# Patient Record
Sex: FEMALE | Race: WHITE | NOT HISPANIC OR LATINO | Employment: UNEMPLOYED | URBAN - NONMETROPOLITAN AREA
[De-identification: names, ages, dates, MRNs, and addresses within clinical notes are randomized per-mention and may not be internally consistent; named-entity substitution may affect disease eponyms.]

---

## 2017-01-18 ENCOUNTER — TELEPHONE (OUTPATIENT)
Dept: CARDIOLOGY | Facility: CLINIC | Age: 62
End: 2017-01-18

## 2017-01-18 NOTE — TELEPHONE ENCOUNTER
Patient scheduled with Dr. Santillan at 3pm tomorrow patient aware of appt date and time and to bring in complete list of medications or medication bottles with her for appt.  Patient verbalizes understanding.

## 2017-01-18 NOTE — TELEPHONE ENCOUNTER
Patient called in requesting appt today,  reporting having trouble breathing accompanied with CP for few days now, does not have a device to check b/p or pulse, advised patient several times to go to er for evaluation.  Patient is requesting appt Thursday. 1-19-17  with You, Do you want to see her tomorrow? Thank You.

## 2017-05-16 ENCOUNTER — OFFICE VISIT (OUTPATIENT)
Dept: CARDIOLOGY | Facility: CLINIC | Age: 62
End: 2017-05-16

## 2017-05-16 ENCOUNTER — TELEPHONE (OUTPATIENT)
Dept: CARDIOLOGY | Facility: CLINIC | Age: 62
End: 2017-05-16

## 2017-05-16 VITALS
HEART RATE: 60 BPM | HEIGHT: 61 IN | WEIGHT: 139 LBS | DIASTOLIC BLOOD PRESSURE: 70 MMHG | SYSTOLIC BLOOD PRESSURE: 130 MMHG | BODY MASS INDEX: 26.24 KG/M2

## 2017-05-16 DIAGNOSIS — E78.49 OTHER HYPERLIPIDEMIA: ICD-10-CM

## 2017-05-16 DIAGNOSIS — E11.9 TYPE 2 DIABETES MELLITUS WITHOUT COMPLICATION, WITHOUT LONG-TERM CURRENT USE OF INSULIN (HCC): ICD-10-CM

## 2017-05-16 DIAGNOSIS — I10 ESSENTIAL HYPERTENSION: ICD-10-CM

## 2017-05-16 DIAGNOSIS — I25.10 CORONARY ARTERY DISEASE INVOLVING NATIVE CORONARY ARTERY OF NATIVE HEART WITHOUT ANGINA PECTORIS: Primary | ICD-10-CM

## 2017-05-16 PROBLEM — E03.9 HYPOTHYROID: Status: ACTIVE | Noted: 2017-05-16

## 2017-05-16 PROBLEM — E78.5 HYPERLIPEMIA: Status: ACTIVE | Noted: 2017-05-16

## 2017-05-16 PROCEDURE — 99214 OFFICE O/P EST MOD 30 MIN: CPT | Performed by: NURSE PRACTITIONER

## 2017-05-16 RX ORDER — LEVOTHYROXINE SODIUM 88 UG/1
88 TABLET ORAL DAILY
COMMUNITY
End: 2021-06-04

## 2017-05-16 RX ORDER — RANOLAZINE 500 MG/1
TABLET, EXTENDED RELEASE ORAL
Qty: 360 TABLET | Refills: 2 | Status: SHIPPED | OUTPATIENT
Start: 2017-05-16 | End: 2018-02-06 | Stop reason: SDUPTHER

## 2017-06-15 ENCOUNTER — OUTSIDE FACILITY SERVICE (OUTPATIENT)
Dept: CARDIOLOGY | Facility: CLINIC | Age: 62
End: 2017-06-15

## 2017-06-15 PROCEDURE — 93458 L HRT ARTERY/VENTRICLE ANGIO: CPT | Performed by: INTERNAL MEDICINE

## 2017-06-16 ENCOUNTER — OUTSIDE FACILITY SERVICE (OUTPATIENT)
Dept: CARDIOLOGY | Facility: CLINIC | Age: 62
End: 2017-06-16

## 2017-06-16 PROCEDURE — 99217 PR OBSERVATION CARE DISCHARGE MANAGEMENT: CPT | Performed by: INTERNAL MEDICINE

## 2017-08-16 ENCOUNTER — OFFICE VISIT (OUTPATIENT)
Dept: CARDIOLOGY | Facility: CLINIC | Age: 62
End: 2017-08-16

## 2017-08-16 VITALS
DIASTOLIC BLOOD PRESSURE: 68 MMHG | BODY MASS INDEX: 25.86 KG/M2 | WEIGHT: 137 LBS | SYSTOLIC BLOOD PRESSURE: 130 MMHG | HEART RATE: 80 BPM | HEIGHT: 61 IN

## 2017-08-16 DIAGNOSIS — E78.00 PURE HYPERCHOLESTEROLEMIA: ICD-10-CM

## 2017-08-16 DIAGNOSIS — I25.10 CORONARY ARTERY DISEASE INVOLVING NATIVE CORONARY ARTERY OF NATIVE HEART WITHOUT ANGINA PECTORIS: Primary | ICD-10-CM

## 2017-08-16 DIAGNOSIS — I34.0 NON-RHEUMATIC MITRAL REGURGITATION: ICD-10-CM

## 2017-08-16 DIAGNOSIS — Z95.5 PRESENCE OF STENT IN LAD CORONARY ARTERY: ICD-10-CM

## 2017-08-16 DIAGNOSIS — I10 ESSENTIAL HYPERTENSION: ICD-10-CM

## 2017-08-16 PROCEDURE — 99213 OFFICE O/P EST LOW 20 MIN: CPT | Performed by: NURSE PRACTITIONER

## 2017-08-16 RX ORDER — ACYCLOVIR 400 MG/1
400 TABLET ORAL DAILY
COMMUNITY

## 2017-08-16 RX ORDER — NITROGLYCERIN 0.4 MG/1
0.4 TABLET SUBLINGUAL
Qty: 25 TABLET | Refills: 1 | Status: SHIPPED | OUTPATIENT
Start: 2017-08-16 | End: 2017-08-30 | Stop reason: SDUPTHER

## 2017-08-16 RX ORDER — VENLAFAXINE 75 MG/1
75 TABLET ORAL DAILY
COMMUNITY
End: 2022-01-01

## 2017-08-16 NOTE — PROGRESS NOTES
Chief Complaint   Patient presents with   • Hospital follow-up     heart cath with stenting to LAD at [facility] on 6/15/17. labs per PCP, patient brought medication list with visit.        Subjective       Sunday Mcclure is a 62 y.o. female  with a history of ischemic heart disease diagnosed in July 2013. She underwent thrombectomy of the LAD followed by atherectomy and stenting. Her stress test in 2013 showed septal infarct with jaime-infarct ischemia. In 2015 she was admitted to [facility] after syncopal episode. She underwent workup including an echocardiogram that showed normal ejection fraction. Carotid ultrasound showed disease of the right internal carotid artery. Brain scan did not show acute changes. An MARIANA was also done that showed moderate to severe neuropathy. She then underwent physical therapy at home and had no residual effects on memory or mobility. Given risk of silent ischemia and history of CAD repeat stress testing and repeat carotid ultrasound recommended. Stress showed apical infarct with jaime-infarct ischemia. Elective cath was recommended. At her last office visit she reported indigestion and a pressure in the middle of her chest. Cardiac cath was done and stent placed in her LAD.  Today she comes to the office for a follow up appointment and states she is feeling much better. No indigestion or chest discomfort noted.      HPI         Cardiac History:    Past Surgical History:   Procedure Laterality Date   • CARDIAC CATHETERIZATION  07/08/2013    EF 45%. 88% LAD- PTCA & Throbectomy. R/O Dissection    • CARDIAC CATHETERIZATION  07/24/2013    ( Dr. Singer) 85% LAD- Atherectomy & 3.5x32 Promus    • CARDIAC CATHETERIZATION  06/15/2017    99% LAD- 2.25x12 & 2.25x28 Promus. 40% RCA- FFR- 0.89   • CARDIOVASCULAR STRESS TEST  08/05/2013     L. Myoview- ([facility]) EF 50% Septal Infarct with jaime-infarct ischemia    • CARDIOVASCULAR STRESS TEST  11/07/2016    EF 65%, apical infarct with jaime-infarct  ischemia, continue current   • ECHO - CONVERTED  07/09/2013     ([facility]) EF 35%    • ECHO - CONVERTED  08/05/2013    ([facility]) EF 40%    • ECHO - CONVERTED  08/11/2015    ([facility]) - EF 55-60%    • ECHO - CONVERTED  11/07/2016    EF 60-65%, mild AR, mild MR   • US CAROTID UNILATERAL  11/03/2016    Less than 50% bilaterally       Current Outpatient Prescriptions   Medication Sig Dispense Refill   • acyclovir (ZOVIRAX) 400 MG tablet Take 400 mg by mouth Daily. Take no more than 5 doses a day.     • aspirin 81 MG EC tablet Take 81 mg by mouth Daily.     • atorvastatin (LIPITOR) 20 MG tablet Take 20 mg by mouth Daily.     • Calcium Polycarbophil (FIBERTAB PO) Take 3 tablets by mouth Daily.     • insulin aspart (novoLOG) 100 UNIT/ML injection Per insulin pump as directed.     • LamoTRIgine (LAMICTAL XR PO) Take 300 mg by mouth Daily.     • levothyroxine (SYNTHROID) 88 MCG tablet Take 88 mcg by mouth Daily.     • nitroglycerin (NITROSTAT) 0.4 MG SL tablet Place 1 tablet under the tongue Every 5 (Five) Minutes As Needed for Chest Pain. Take no more than 3 doses in 15 minutes. 25 tablet 1   • polyethylene glycol (MIRALAX) packet Take 17 g by mouth Daily.     • prasugrel (EFFIENT) 10 MG tablet Take 10 mg by mouth Daily.     • pregabalin (LYRICA) 50 MG capsule Take 50 mg by mouth 3 (Three) Times a Day.     • ranolazine (RANEXA) 500 MG 12 hr tablet 2 tablets twice daily 360 tablet 2   • venlafaxine (EFFEXOR) 75 MG tablet Take 75 mg by mouth Daily.     • venlafaxine XR (EFFEXOR-XR) 150 MG 24 hr capsule Take 150 mg by mouth Daily.       No current facility-administered medications for this visit.        Review of patient's allergies indicates no known allergies.    Past Medical History:   Diagnosis Date   • MARIANA: Moderate to severe sensoral neuropathy 08/12/2015   • Anxiety    • Arthritis    • Breast cancer     lumpectomy left breast, radiation   • Diabetes    • Diabetic neuropathy    • H/O carpal tunnel repair     multiple  "surgeries   • H/O hand surgery     trigger finger   • H/O thyroidectomy     thyroid cancer   • H/O tubal ligation    • H/O:     • History of knee replacement     Left and right   • Hx of appendectomy    • Hypercholesterolemia    • Hypertension    • MRI head: No acute CVA seen 2015       Social History     Social History   • Marital status:      Spouse name: N/A   • Number of children: N/A   • Years of education: N/A     Occupational History   • Not on file.     Social History Main Topics   • Smoking status: Never Smoker   • Smokeless tobacco: Never Used   • Alcohol use No   • Drug use: No   • Sexual activity: Not on file     Other Topics Concern   • Not on file     Social History Narrative       Family History   Problem Relation Age of Onset   • No Known Problems Mother    • Lymphoma Father    • Myasthenia gravis Sister        Review of Systems   Constitution: Negative for decreased appetite and malaise/fatigue.   HENT: Negative for congestion and sore throat.    Eyes: Negative for blurred vision.   Cardiovascular: Negative for chest pain, dyspnea on exertion, leg swelling, near-syncope and palpitations.   Respiratory: Negative for shortness of breath and snoring.    Endocrine: Negative for cold intolerance and heat intolerance.   Hematologic/Lymphatic: Negative for adenopathy. Does not bruise/bleed easily.   Skin: Negative for itching, nail changes and skin cancer.   Musculoskeletal: Negative for arthritis and myalgias.   Gastrointestinal: Negative for abdominal pain, dysphagia, heartburn and nausea.   Genitourinary: Negative for bladder incontinence and frequency.   Neurological: Negative for dizziness, light-headedness, seizures and vertigo.   Psychiatric/Behavioral: Negative for altered mental status.   Allergic/Immunologic: Negative for environmental allergies and hives.        Diabetes- Yes  Thyroid-abnormal    Objective     /68 (BP Location: Left arm)  Pulse 80  Ht 61\" (154.9 " cm)  Wt 137 lb (62.1 kg)  BMI 25.89 kg/m2    Physical Exam   Constitutional: She is oriented to person, place, and time. She appears well-nourished.   HENT:   Head: Normocephalic.   Eyes: Pupils are equal, round, and reactive to light.   Neck: Normal range of motion. No JVD present.   Cardiovascular: Normal rate, regular rhythm, S1 normal and S2 normal.    Murmur heard.   Systolic murmur is present with a grade of 2/6   Pulmonary/Chest: Effort normal and breath sounds normal. She has no wheezes. She has no rales.   Abdominal: Soft. Bowel sounds are normal. She exhibits no distension. There is no tenderness.   Musculoskeletal: Normal range of motion. She exhibits no edema.   Neurological: She is alert and oriented to person, place, and time.   Skin: Skin is warm and dry. No rash noted. No pallor.   Psychiatric: She has a normal mood and affect. Her behavior is normal.      Procedures        Assessment/Plan      Sunday was seen today for hospital follow-up.    Diagnoses and all orders for this visit:    Coronary artery disease involving native coronary artery of native heart without angina pectoris    Presence of stent in LAD coronary artery    Essential hypertension    Pure hypercholesterolemia    Non-rheumatic mitral regurgitation    Other orders  -     nitroglycerin (NITROSTAT) 0.4 MG SL tablet; Place 1 tablet under the tongue Every 5 (Five) Minutes As Needed for Chest Pain. Take no more than 3 doses in 15 minutes.    The report of her recent cath was reviewed. Since intervention with stent to her LAD she denies symptoms. Her vital signs are at goal. No signs of bleeding or increased bruising noted. We will continue same cardiac medications and she understands importance of Effient therapy. She will follow with you for management of labs.                Electronically signed by SARAH Trinh,  August 18, 2017 8:22 AM

## 2017-08-18 PROBLEM — Z95.5 PRESENCE OF STENT IN LAD CORONARY ARTERY: Status: ACTIVE | Noted: 2017-08-18

## 2017-08-18 PROBLEM — I34.0 NON-RHEUMATIC MITRAL REGURGITATION: Status: ACTIVE | Noted: 2017-08-18

## 2017-08-30 RX ORDER — NITROGLYCERIN 0.4 MG/1
TABLET SUBLINGUAL
Qty: 25 TABLET | Refills: 1 | Status: SHIPPED | OUTPATIENT
Start: 2017-08-30 | End: 2018-08-23 | Stop reason: SDUPTHER

## 2018-02-06 RX ORDER — RANOLAZINE 500 MG/1
TABLET, FILM COATED, EXTENDED RELEASE ORAL
Qty: 360 TABLET | Refills: 2 | Status: SHIPPED | OUTPATIENT
Start: 2018-02-06 | End: 2018-02-19 | Stop reason: DRUGHIGH

## 2018-02-19 ENCOUNTER — OFFICE VISIT (OUTPATIENT)
Dept: CARDIOLOGY | Facility: CLINIC | Age: 63
End: 2018-02-19

## 2018-02-19 VITALS
HEART RATE: 68 BPM | BODY MASS INDEX: 26.06 KG/M2 | WEIGHT: 138 LBS | HEIGHT: 61 IN | SYSTOLIC BLOOD PRESSURE: 110 MMHG | DIASTOLIC BLOOD PRESSURE: 60 MMHG

## 2018-02-19 DIAGNOSIS — E03.8 OTHER SPECIFIED HYPOTHYROIDISM: ICD-10-CM

## 2018-02-19 DIAGNOSIS — I25.10 CORONARY ARTERY DISEASE INVOLVING NATIVE CORONARY ARTERY OF NATIVE HEART WITHOUT ANGINA PECTORIS: Primary | ICD-10-CM

## 2018-02-19 DIAGNOSIS — Z95.5 PRESENCE OF STENT IN LAD CORONARY ARTERY: ICD-10-CM

## 2018-02-19 DIAGNOSIS — E78.00 PURE HYPERCHOLESTEROLEMIA: ICD-10-CM

## 2018-02-19 DIAGNOSIS — E10.69 TYPE 1 DIABETES MELLITUS WITH OTHER SPECIFIED COMPLICATION (HCC): ICD-10-CM

## 2018-02-19 DIAGNOSIS — I10 ESSENTIAL HYPERTENSION: ICD-10-CM

## 2018-02-19 PROCEDURE — 99213 OFFICE O/P EST LOW 20 MIN: CPT | Performed by: NURSE PRACTITIONER

## 2018-02-19 RX ORDER — PRASUGREL 10 MG/1
10 TABLET, FILM COATED ORAL DAILY
Qty: 90 TABLET | Refills: 3 | Status: SHIPPED | OUTPATIENT
Start: 2018-02-19 | End: 2018-08-23 | Stop reason: SDUPTHER

## 2018-02-19 RX ORDER — RANOLAZINE 500 MG/1
TABLET, EXTENDED RELEASE ORAL
COMMUNITY
End: 2018-02-19 | Stop reason: SDUPTHER

## 2018-02-19 RX ORDER — RANOLAZINE 500 MG/1
500 TABLET, EXTENDED RELEASE ORAL EVERY 12 HOURS SCHEDULED
Qty: 180 TABLET | Refills: 3 | Status: SHIPPED | OUTPATIENT
Start: 2018-02-19 | End: 2018-02-20 | Stop reason: SDUPTHER

## 2018-02-19 RX ORDER — ASPIRIN 81 MG/1
81 TABLET ORAL DAILY
Qty: 90 TABLET | Refills: 3 | Status: SHIPPED | OUTPATIENT
Start: 2018-02-19 | End: 2018-08-23 | Stop reason: SDUPTHER

## 2018-02-19 NOTE — PROGRESS NOTES
Chief Complaint   Patient presents with   • Follow-up     For cardiac management. Last labs about 2 months ago with PCP.   • Chest Pain     She reports one episode of chest tightness to mid chest about 3 weeks ago.   • Shortness of Breath     She reports new onset of with exertion. She reports having some lightheadedness at times, will relieve with rest.   • Med Refill     Needs refills on cardiac medication-90 day.       Subjective       Sunday Mcclure is a 63 y.o. female with a history of ischemic heart disease diagnosed in July 2013 when she underwent thrombectomy of the LAD followed by atherectomy and stenting.  Stress test in 2013 post-stenting showed septal infarct with jaime-infarct ischemia managed medically.  In 2015, she was admitted to [facility] after syncopal episode.  Workup including an echocardiogram and carotid US showed normal EF and disease of the JACQUES. Brain scan did not show acute changes.  She then underwent physical therapy at home and had no residual effects on memory or mobility. Given risk of silent ischemia and history of CAD repeat stress and carotid ultrasound recommended.  Carotid US showed <50% bilaterally.  Stress showed apical infarct with jaime-infarct ischemia.  We initially tried medical management but chest pain persisted.  She underwent cath on 6/15/17 showing previously placed stent to the proximal to mid LAD was patent but 99% disease of mid to distal LAD noted which was stented x 2.  She also had disease of the RCA with acceptable FFR with medical management preferred.  She came in today for her follow up visit overall feeling well.  She does have some mild shortness of breath with exertion, relieved by rest, but she hasn't been as active as usual.  Labs have been managed with primary care as well as endocrinology with her last A1C reported as 8.2%.      HPI         Cardiac History:    Past Surgical History:   Procedure Laterality Date   • CARDIAC CATHETERIZATION   07/08/2013    EF 45%. 88% LAD- PTCA & Throbectomy. R/O Dissection    • CARDIAC CATHETERIZATION  07/24/2013    ( Dr. Singer) 85% LAD- Atherectomy & 3.5x32 Promus    • CARDIAC CATHETERIZATION  06/15/2017    99% LAD- 2.25x12 & 2.25x28 Promus. 40% RCA- FFR- 0.89   • CARDIOVASCULAR STRESS TEST  08/05/2013     L. Myoview- ([facility]) EF 50% Septal Infarct with jaime-infarct ischemia    • CARDIOVASCULAR STRESS TEST  11/07/2016    EF 65%, apical infarct with jaime-infarct ischemia, continue current   • ECHO - CONVERTED  07/09/2013     ([facility]) EF 35%    • ECHO - CONVERTED  08/05/2013    ([facility]) EF 40%    • ECHO - CONVERTED  08/11/2015    ([facility]) - EF 55-60%    • ECHO - CONVERTED  11/07/2016    EF 60-65%, mild AR, mild MR   • US CAROTID UNILATERAL  11/03/2016    Less than 50% bilaterally       Current Outpatient Prescriptions   Medication Sig Dispense Refill   • acyclovir (ZOVIRAX) 400 MG tablet Take 400 mg by mouth Daily. Take no more than 5 doses a day.     • aspirin 81 MG EC tablet Take 1 tablet by mouth Daily. 90 tablet 3   • atorvastatin (LIPITOR) 20 MG tablet Take 20 mg by mouth Daily.     • Calcium Polycarbophil (FIBERTAB PO) Take 3 tablets by mouth Daily.     • insulin aspart (novoLOG) 100 UNIT/ML injection Per insulin pump as directed.     • LamoTRIgine (LAMICTAL XR PO) Take 300 mg by mouth Daily.     • levothyroxine (SYNTHROID) 88 MCG tablet Take 88 mcg by mouth Daily.     • nitroglycerin (NITROSTAT) 0.4 MG SL tablet PLACE 1 TABLET UNDER THE TONGUE EVERY 5 MINUTES AS NEEDED FOR CHEST PAIN. TAKE NO MORE THAN 3 DOSES IN 15 MINUTES. 25 tablet 1   • polyethylene glycol (MIRALAX) packet Take 17 g by mouth Daily As Needed.     • prasugrel (EFFIENT) 10 MG tablet Take 1 tablet by mouth Daily. 90 tablet 3   • pregabalin (LYRICA) 50 MG capsule Take 50 mg by mouth 3 (Three) Times a Day.     • ranolazine (RANEXA) 500 MG 12 hr tablet Take 1 tablet by mouth Every 12 (Twelve) Hours. 2 tablets twice daily 180 tablet 3   •  venlafaxine (EFFEXOR) 75 MG tablet Take 75 mg by mouth Daily.     • venlafaxine XR (EFFEXOR-XR) 150 MG 24 hr capsule Take 150 mg by mouth Daily.       No current facility-administered medications for this visit.        Review of patient's allergies indicates no known allergies.    Past Medical History:   Diagnosis Date   • MARIANA: Moderate to severe sensoral neuropathy 2015   • Anxiety    • Arthritis    • Breast cancer     lumpectomy left breast, radiation   • Diabetes    • Diabetic neuropathy    • H/O carpal tunnel repair     multiple surgeries   • H/O hand surgery     trigger finger   • H/O thyroidectomy     thyroid cancer   • H/O tubal ligation    • H/O:     • History of knee replacement     Left and right   • Hx of appendectomy    • Hypercholesterolemia    • Hypertension    • MRI head: No acute CVA seen 2015       Social History     Social History   • Marital status:      Spouse name: N/A   • Number of children: N/A   • Years of education: N/A     Occupational History   • Not on file.     Social History Main Topics   • Smoking status: Never Smoker   • Smokeless tobacco: Never Used   • Alcohol use No   • Drug use: No   • Sexual activity: Not on file     Other Topics Concern   • Not on file     Social History Narrative       Family History   Problem Relation Age of Onset   • No Known Problems Mother    • Lymphoma Father    • Myasthenia gravis Sister        Review of Systems   Constitution: Positive for malaise/fatigue. Negative for weight loss.   HENT: Negative.    Eyes: Negative.    Cardiovascular: Positive for dyspnea on exertion. Negative for chest pain, claudication, leg swelling, near-syncope and palpitations.   Respiratory: Positive for shortness of breath. Negative for cough and wheezing.    Endocrine: Negative.    Hematologic/Lymphatic: Negative for bleeding problem. Does not bruise/bleed easily.   Skin: Negative.    Musculoskeletal: Negative for falls and myalgias.  "  Gastrointestinal: Negative for abdominal pain, melena and nausea.   Genitourinary: Negative for dysuria and hematuria.   Neurological: Positive for numbness and sensory change. Negative for dizziness.   Psychiatric/Behavioral: Negative for altered mental status and depression.   Allergic/Immunologic: Negative.         Diabetes- yes  Thyroid-normal, no available TSH     Objective     /60 (BP Location: Right arm)  Pulse 68  Ht 154.9 cm (60.98\")  Wt 62.6 kg (138 lb)  BMI 26.09 kg/m2    Physical Exam   Constitutional: She is oriented to person, place, and time. She appears well-developed and well-nourished.   HENT:   Head: Normocephalic.   Eyes: Pupils are equal, round, and reactive to light.   Neck: Normal range of motion.   Cardiovascular: Normal rate, regular rhythm and intact distal pulses.    Murmur heard.  Pulmonary/Chest: Effort normal and breath sounds normal. No respiratory distress. She has no wheezes.   Abdominal: Soft. Bowel sounds are normal. She exhibits no distension.   Musculoskeletal: Normal range of motion. She exhibits no edema.   Neurological: She is alert and oriented to person, place, and time.   Skin: Skin is warm and dry.   Psychiatric: She has a normal mood and affect.   Nursing note and vitals reviewed.     Procedures          Assessment/Plan    Heart rate and blood pressure stable.  I explained the findings of her cardiac cath and stent placement.  No medication changes today.  Continue Effient and aspirin as well as statin and Ranexa.  She is not on an ace inhibitor which may need to be considered if her blood pressure will tolerate with her diabetic status.  We discussed this and she would like to discuss with Dr. Carranza.  No recent labs available for review.  We talked about anginal equivalent symptoms and she will contact our office if she develops any shortness of breath, chest pressure, diaphoresis, or fatigue.  She feels she is stable at this point, so no further work up " ordered today.  Refills sent for Effient and Ranexa.  She will follow up with primary care and endo for labs.  We will see her back in six months or sooner if any new or worsening symptoms develop.         Sunday was seen today for follow-up, chest pain, shortness of breath and med refill.    Diagnoses and all orders for this visit:    Coronary artery disease involving native coronary artery of native heart without angina pectoris    Essential hypertension    Pure hypercholesterolemia    Presence of stent in LAD coronary artery    Other specified hypothyroidism    Type 1 diabetes mellitus with other specified complication    Other orders  -     aspirin 81 MG EC tablet; Take 1 tablet by mouth Daily.  -     prasugrel (EFFIENT) 10 MG tablet; Take 1 tablet by mouth Daily.  -     ranolazine (RANEXA) 500 MG 12 hr tablet; Take 1 tablet by mouth Every 12 (Twelve) Hours. 2 tablets twice daily                    Electronically signed by SARAH Harrison,  February 19, 2018 8:23 PM

## 2018-02-20 ENCOUNTER — TELEPHONE (OUTPATIENT)
Dept: CARDIOLOGY | Facility: CLINIC | Age: 63
End: 2018-02-20

## 2018-02-20 RX ORDER — RANOLAZINE 500 MG/1
TABLET, EXTENDED RELEASE ORAL
Qty: 360 TABLET | Refills: 3 | Status: SHIPPED | OUTPATIENT
Start: 2018-02-20 | End: 2018-08-23 | Stop reason: SDUPTHER

## 2018-02-20 NOTE — TELEPHONE ENCOUNTER
Pharmacy called requesting clarification on Ranexa script, verbal clarification given for Ranexa 500mg 2 tablets bid, new script sent to pharmacy with correction.

## 2018-08-23 ENCOUNTER — OFFICE VISIT (OUTPATIENT)
Dept: CARDIOLOGY | Facility: CLINIC | Age: 63
End: 2018-08-23

## 2018-08-23 VITALS
HEIGHT: 61 IN | WEIGHT: 136 LBS | BODY MASS INDEX: 25.68 KG/M2 | HEART RATE: 64 BPM | DIASTOLIC BLOOD PRESSURE: 70 MMHG | SYSTOLIC BLOOD PRESSURE: 120 MMHG

## 2018-08-23 DIAGNOSIS — R42 DIZZINESS: ICD-10-CM

## 2018-08-23 DIAGNOSIS — E11.9 TYPE 2 DIABETES MELLITUS WITHOUT COMPLICATION, WITHOUT LONG-TERM CURRENT USE OF INSULIN (HCC): ICD-10-CM

## 2018-08-23 DIAGNOSIS — R06.02 SHORTNESS OF BREATH: ICD-10-CM

## 2018-08-23 DIAGNOSIS — R53.83 OTHER FATIGUE: ICD-10-CM

## 2018-08-23 DIAGNOSIS — I10 ESSENTIAL HYPERTENSION: ICD-10-CM

## 2018-08-23 DIAGNOSIS — Z95.5 PRESENCE OF STENT IN LAD CORONARY ARTERY: ICD-10-CM

## 2018-08-23 DIAGNOSIS — I25.10 CORONARY ARTERY DISEASE INVOLVING NATIVE CORONARY ARTERY OF NATIVE HEART WITHOUT ANGINA PECTORIS: Primary | ICD-10-CM

## 2018-08-23 DIAGNOSIS — E78.00 PURE HYPERCHOLESTEROLEMIA: ICD-10-CM

## 2018-08-23 DIAGNOSIS — R26.89 IMBALANCE: ICD-10-CM

## 2018-08-23 DIAGNOSIS — I34.0 NON-RHEUMATIC MITRAL REGURGITATION: ICD-10-CM

## 2018-08-23 DIAGNOSIS — I25.6 SILENT MYOCARDIAL ISCHEMIA: ICD-10-CM

## 2018-08-23 DIAGNOSIS — E03.9 ACQUIRED HYPOTHYROIDISM: ICD-10-CM

## 2018-08-23 PROCEDURE — 99214 OFFICE O/P EST MOD 30 MIN: CPT | Performed by: NURSE PRACTITIONER

## 2018-08-23 RX ORDER — PREGABALIN 150 MG/1
150 CAPSULE ORAL 2 TIMES DAILY
COMMUNITY

## 2018-08-23 RX ORDER — RANOLAZINE 500 MG/1
TABLET, EXTENDED RELEASE ORAL
Qty: 360 TABLET | Refills: 3 | Status: SHIPPED | OUTPATIENT
Start: 2018-08-23 | End: 2018-11-05 | Stop reason: SDUPTHER

## 2018-08-23 RX ORDER — PRASUGREL 10 MG/1
10 TABLET, FILM COATED ORAL DAILY
Qty: 90 TABLET | Refills: 3 | Status: SHIPPED | OUTPATIENT
Start: 2018-08-23 | End: 2019-08-22 | Stop reason: SDUPTHER

## 2018-08-23 RX ORDER — ASPIRIN 81 MG/1
81 TABLET ORAL DAILY
Qty: 90 TABLET | Refills: 3 | Status: SHIPPED | OUTPATIENT
Start: 2018-08-23 | End: 2019-08-22 | Stop reason: SDUPTHER

## 2018-08-23 RX ORDER — NITROGLYCERIN 0.4 MG/1
0.4 TABLET SUBLINGUAL
Qty: 25 TABLET | Refills: 1 | Status: SHIPPED | OUTPATIENT
Start: 2018-08-23 | End: 2022-01-01 | Stop reason: SDUPTHER

## 2018-08-23 NOTE — PROGRESS NOTES
Chief Complaint   Patient presents with   • Follow-up     For cardiac management. Patient is on aspirin. Repors that she has no breath when she walks and has been tired. Last lab work was done this week per PCP, not in chart.    • Med Refill     Needs refills on cardiac and nitroglycerin. 90 day supplys to [facility].        Cardiac Complaints  dyspnea and fatigue, dizzy      Subjective   Sunday Mcclure is a 63 y.o. female with HTN, hyperlipidemia, DM,and ischemic heart disease diagnosed in July 2013 when she underwent thrombectomy of the LAD followed by atherectomy and stenting.  Stress test in 2013 post-stenting showed septal infarct with jaime-infarct ischemia managed medically.  In 2015, she was admitted to [facility] after syncopal episode.  Workup including an echocardiogram and carotid US showed normal EF and disease of the JACQUES. Brain scan did not show acute changes.  She then underwent physical therapy at home and had no residual effects on memory or mobility. Given risk of silent ischemia and history of CAD repeat stress and carotid ultrasound recommended.  Carotid US showed <50% bilaterally. Stress showed apical infarct with jaime-infarct ischemia.  We initially tried medical management but chest pain persisted.  She underwent cath on 6/15/17 showing previously placed stent to the proximal to mid LAD was patent but 99% disease of mid to distal LAD noted which was stented x 2.  She also had disease of the RCA with acceptable FFR with medical management preferred.  She returns today for follow up and reports an increase in both shortness of breath with exertion as well as more fatigue.  Patient reports just not being able to do as much as she could several months ago.  She denies any chest pain or palpitations but does admit to more dizziness with any movement.  She says often with even walking down the [place] she will feel very imbalanced and need to grab onto wall.  Discussed with  patient last labs which she states were done a week ago and she thinks thyroid levels and CMP looked okay.  She does admit to elevated AIC of 7.8%,she is following with Dr. Carranza for management.  Cardiac refills requested.      Cardiac History  Past Surgical History:   Procedure Laterality Date   • CARDIAC CATHETERIZATION  07/08/2013    EF 45%. 88% LAD- PTCA & Throbectomy. R/O Dissection    • CARDIAC CATHETERIZATION  07/24/2013    ( Dr. Singer) 85% LAD- Atherectomy & 3.5x32 Promus    • CARDIAC CATHETERIZATION  06/15/2017    99% LAD- 2.25x12 & 2.25x28 Promus. 40% RCA- FFR- 0.89   • CARDIOVASCULAR STRESS TEST  08/05/2013     L. Myoview- ([facility]) EF 50% Septal Infarct with jaime-infarct ischemia    • CARDIOVASCULAR STRESS TEST  11/07/2016    EF 65%, apical infarct with jaime-infarct ischemia, continue current   • ECHO - CONVERTED  07/09/2013     ([facility]) EF 35%    • ECHO - CONVERTED  08/05/2013    ([facility]) EF 40%    • ECHO - CONVERTED  08/11/2015    ([facility]) - EF 55-60%    • ECHO - CONVERTED  11/07/2016    EF 60-65%, mild AR, mild MR   • US CAROTID UNILATERAL  11/03/2016    Less than 50% bilaterally       Current Outpatient Prescriptions   Medication Sig Dispense Refill   • acyclovir (ZOVIRAX) 400 MG tablet Take 400 mg by mouth Daily. Take no more than 5 doses a day.     • aspirin 81 MG EC tablet Take 1 tablet by mouth Daily. 90 tablet 3   • atorvastatin (LIPITOR) 20 MG tablet Take 20 mg by mouth Daily.     • Calcium Polycarbophil (FIBERTAB PO) Take 3 tablets by mouth Daily.     • insulin aspart (novoLOG) 100 UNIT/ML injection Per insulin pump as directed.     • LamoTRIgine (LAMICTAL XR PO) Take 300 mg by mouth Daily.     • levothyroxine (SYNTHROID) 88 MCG tablet Take 88 mcg by mouth Daily.     • nitroglycerin (NITROSTAT) 0.4 MG SL tablet Place 1 tablet under the tongue Every 5 (Five) Minutes As Needed for Chest Pain. Take no more than 3 doses in 15 minutes. 25 tablet 1   • polyethylene glycol (MIRALAX) packet Take 17  g by mouth Daily As Needed.     • prasugrel (EFFIENT) 10 MG tablet Take 1 tablet by mouth Daily. 90 tablet 3   • pregabalin (LYRICA) 150 MG capsule Take 150 mg by mouth 2 (Two) Times a Day.     • ranolazine (RANEXA) 500 MG 12 hr tablet 2 tablets twice daily 360 tablet 3   • venlafaxine (EFFEXOR) 75 MG tablet Take 75 mg by mouth Daily.     • venlafaxine XR (EFFEXOR-XR) 150 MG 24 hr capsule Take 150 mg by mouth Daily.       No current facility-administered medications for this visit.        Patient has no known allergies.    Past Medical History:   Diagnosis Date   • MARIANA: Moderate to severe sensoral neuropathy 2015   • Anxiety    • Arthritis    • Breast cancer (CMS/HCC)     lumpectomy left breast, radiation   • Diabetes (CMS/HCC)    • Diabetic neuropathy (CMS/HCC)    • H/O carpal tunnel repair     multiple surgeries   • H/O hand surgery     trigger finger   • H/O thyroidectomy     thyroid cancer   • H/O tubal ligation    • H/O:     • History of knee replacement     Left and right   • Hx of appendectomy    • Hypercholesterolemia    • Hypertension    • MRI head: No acute CVA seen 2015       Social History     Social History   • Marital status:      Spouse name: N/A   • Number of children: N/A   • Years of education: N/A     Occupational History   • Not on file.     Social History Main Topics   • Smoking status: Never Smoker   • Smokeless tobacco: Never Used   • Alcohol use No   • Drug use: No   • Sexual activity: Not on file     Other Topics Concern   • Not on file     Social History Narrative   • No narrative on file       Family History   Problem Relation Age of Onset   • No Known Problems Mother    • Lymphoma Father    • Myasthenia gravis Sister        Review of Systems   Constitution: Positive for malaise/fatigue. Negative for weakness.   Cardiovascular: Positive for dyspnea on exertion. Negative for chest pain, irregular heartbeat, orthopnea, palpitations and syncope.   Respiratory:  "Positive for shortness of breath. Negative for cough and wheezing.    Musculoskeletal: Negative for back pain, joint pain and joint swelling.   Gastrointestinal: Negative for anorexia, heartburn, nausea and vomiting.   Genitourinary: Negative for dysuria, hematuria, hesitancy and nocturia.   Neurological: Positive for dizziness and loss of balance. Negative for light-headedness.   Psychiatric/Behavioral: Negative for depression and memory loss. The patient is not nervous/anxious.            Objective     /70 (BP Location: Right arm)   Pulse 64   Ht 154.9 cm (60.98\")   Wt 61.7 kg (136 lb)   BMI 25.71 kg/m²     Physical Exam   Constitutional: She is oriented to person, place, and time. She appears well-developed and well-nourished.   HENT:   Head: Normocephalic and atraumatic.   Eyes: Pupils are equal, round, and reactive to light. EOM are normal.   Neck: Normal range of motion. Neck supple.   Cardiovascular: Normal rate and regular rhythm.    Murmur heard.  Pulmonary/Chest: Effort normal and breath sounds normal.   Abdominal: Soft.   Musculoskeletal: Normal range of motion.   Neurological: She is alert and oriented to person, place, and time.   Skin: Skin is warm and dry.   Psychiatric: She has a normal mood and affect. Her behavior is normal.       Procedures    Assessment/Plan     HR and BP are stable today.  HTN remains well managed with diet alone, no adjustment recommended.  Since she does have a history of IHD, more cardiac symptoms of shortness of breath and fatigue, and risk of silent ischemic burden, repeat cardiac workup with stress and echo will be advised to assess ischemic burden, cardiac structures, and LV function.  More recommendations to follow.  Since more dizziness and imbalance also reported, repeat bilateral carotid US encouraged to reassess carotid arteries.  Labs including TSH for hypothyroidism, lipid panel for hyperlipidemia, CMP, and AIC she reports with your office.  Could we have " most recent for our review? She will continue on current statin dosing for hyperlipidemia management.  Refills of cardiac meds sent per request along with NTG SL for anginal control.  BMI slightly elevated at 25.71 good cardiac ADA diet with limited starches and sugars recommended for both weight control as well for better DM management.   6 month follow up advised or sooner if needed.        Problems Addressed this Visit        Cardiovascular and Mediastinum    Coronary artery disease involving native coronary artery of native heart without angina pectoris - Primary    Relevant Medications    prasugrel (EFFIENT) 10 MG tablet    ranolazine (RANEXA) 500 MG 12 hr tablet    nitroglycerin (NITROSTAT) 0.4 MG SL tablet    Other Relevant Orders    Adult Transthoracic Echo Complete W/ Cont if Necessary Per Protocol    Stress Test With Myocardial Perfusion One Day    HTN (hypertension)    Hyperlipemia    Non-rheumatic mitral regurgitation    Relevant Medications    prasugrel (EFFIENT) 10 MG tablet    ranolazine (RANEXA) 500 MG 12 hr tablet    nitroglycerin (NITROSTAT) 0.4 MG SL tablet    Presence of stent in LAD coronary artery    Relevant Orders    Adult Transthoracic Echo Complete W/ Cont if Necessary Per Protocol    Stress Test With Myocardial Perfusion One Day       Endocrine    Hypothyroid    Type 2 diabetes mellitus without complication, without long-term current use of insulin (CMS/[facility])    Relevant Orders    Adult Transthoracic Echo Complete W/ Cont if Necessary Per Protocol    Stress Test With Myocardial Perfusion One Day      Other Visit Diagnoses     Shortness of breath        Relevant Orders    Adult Transthoracic Echo Complete W/ Cont if Necessary Per Protocol    Stress Test With Myocardial Perfusion One Day    Other fatigue        Relevant Orders    Adult Transthoracic Echo Complete W/ Cont if Necessary Per Protocol    Stress Test With Myocardial Perfusion One Day    Silent myocardial ischemia        Relevant  Medications    prasugrel (EFFIENT) 10 MG tablet    ranolazine (RANEXA) 500 MG 12 hr tablet    nitroglycerin (NITROSTAT) 0.4 MG SL tablet    Other Relevant Orders    Adult Transthoracic Echo Complete W/ Cont if Necessary Per Protocol    Stress Test With Myocardial Perfusion One Day    Dizziness        Relevant Orders    US Carotid Bilateral    Imbalance        Relevant Orders    US Carotid Bilateral          Patient's Body mass index is 25.71 kg/m². BMI is above normal parameters. Recommendations include: nutrition counseling.          Electronically signed by SARAH Miller August 23, 2018 4:07 PM

## 2018-08-28 ENCOUNTER — HOSPITAL ENCOUNTER (OUTPATIENT)
Dept: CARDIOLOGY | Facility: HOSPITAL | Age: 63
Discharge: HOME | End: 2018-08-28

## 2018-08-28 DIAGNOSIS — E11.9 TYPE 2 DIABETES MELLITUS WITHOUT COMPLICATION, WITHOUT LONG-TERM CURRENT USE OF INSULIN (HCC): ICD-10-CM

## 2018-08-28 DIAGNOSIS — Z95.5 PRESENCE OF STENT IN LAD CORONARY ARTERY: ICD-10-CM

## 2018-08-28 DIAGNOSIS — I25.10 CORONARY ARTERY DISEASE INVOLVING NATIVE CORONARY ARTERY OF NATIVE HEART WITHOUT ANGINA PECTORIS: ICD-10-CM

## 2018-08-28 DIAGNOSIS — R06.02 SHORTNESS OF BREATH: ICD-10-CM

## 2018-08-28 DIAGNOSIS — I25.6 SILENT MYOCARDIAL ISCHEMIA: ICD-10-CM

## 2018-08-28 DIAGNOSIS — R53.83 OTHER FATIGUE: ICD-10-CM

## 2018-08-28 LAB
MAXIMAL PREDICTED HEART RATE: 159 BPM
MAXIMAL PREDICTED HEART RATE: 159 BPM
STRESS TARGET HR: 135 BPM
STRESS TARGET HR: 135 BPM

## 2018-08-28 PROCEDURE — 93306 TTE W/DOPPLER COMPLETE: CPT

## 2018-08-28 PROCEDURE — 93306 TTE W/DOPPLER COMPLETE: CPT | Performed by: INTERNAL MEDICINE

## 2018-08-28 PROCEDURE — A9500 TC99M SESTAMIBI: HCPCS | Performed by: INTERNAL MEDICINE

## 2018-08-28 PROCEDURE — 93017 CV STRESS TEST TRACING ONLY: CPT

## 2018-08-28 PROCEDURE — 25010000002 REGADENOSON 0.4 MG/5ML SOLUTION: Performed by: INTERNAL MEDICINE

## 2018-08-28 PROCEDURE — 0 TECHNETIUM SESTAMIBI: Performed by: INTERNAL MEDICINE

## 2018-08-28 PROCEDURE — 78452 HT MUSCLE IMAGE SPECT MULT: CPT | Performed by: INTERNAL MEDICINE

## 2018-08-28 PROCEDURE — 78452 HT MUSCLE IMAGE SPECT MULT: CPT

## 2018-08-28 PROCEDURE — 93018 CV STRESS TEST I&R ONLY: CPT | Performed by: INTERNAL MEDICINE

## 2018-08-28 RX ADMIN — REGADENOSON 0.4 MG: 0.08 INJECTION, SOLUTION INTRAVENOUS at 12:10

## 2018-08-28 RX ADMIN — TECHNETIUM TC 99M SESTAMIBI 1 DOSE: 1 INJECTION INTRAVENOUS at 12:13

## 2018-08-28 RX ADMIN — TECHNETIUM TC 99M SESTAMIBI 1 DOSE: 1 INJECTION INTRAVENOUS at 12:10

## 2018-08-30 ENCOUNTER — TELEPHONE (OUTPATIENT)
Dept: CARDIOLOGY | Facility: CLINIC | Age: 63
End: 2018-08-30

## 2018-11-05 RX ORDER — RANOLAZINE 500 MG/1
TABLET, FILM COATED, EXTENDED RELEASE ORAL
Qty: 360 TABLET | Refills: 1 | Status: SHIPPED | OUTPATIENT
Start: 2018-11-05 | End: 2019-02-21 | Stop reason: SDUPTHER

## 2019-01-23 RX ORDER — PRASUGREL 10 MG/1
TABLET, FILM COATED ORAL
Qty: 90 TABLET | Refills: 3 | Status: SHIPPED | OUTPATIENT
Start: 2019-01-23 | End: 2019-02-21 | Stop reason: SDUPTHER

## 2019-02-11 ENCOUNTER — TELEPHONE (OUTPATIENT)
Dept: CARDIOLOGY | Facility: CLINIC | Age: 64
End: 2019-02-11

## 2019-02-11 NOTE — TELEPHONE ENCOUNTER
Received fax from [facility] for cardiac clearance for patient to have a colonoscopy. Patient is on aspirin and Effient. According to our records, patient's last stent was on 06/15/17.      Fax [phone]

## 2019-02-21 ENCOUNTER — OFFICE VISIT (OUTPATIENT)
Dept: CARDIOLOGY | Facility: CLINIC | Age: 64
End: 2019-02-21

## 2019-02-21 ENCOUNTER — TELEPHONE (OUTPATIENT)
Dept: CARDIOLOGY | Facility: CLINIC | Age: 64
End: 2019-02-21

## 2019-02-21 VITALS
WEIGHT: 141 LBS | SYSTOLIC BLOOD PRESSURE: 120 MMHG | DIASTOLIC BLOOD PRESSURE: 70 MMHG | HEART RATE: 64 BPM | BODY MASS INDEX: 26.62 KG/M2 | HEIGHT: 61 IN

## 2019-02-21 DIAGNOSIS — I25.10 CORONARY ARTERY DISEASE INVOLVING NATIVE CORONARY ARTERY OF NATIVE HEART WITHOUT ANGINA PECTORIS: Primary | ICD-10-CM

## 2019-02-21 DIAGNOSIS — I10 ESSENTIAL HYPERTENSION: ICD-10-CM

## 2019-02-21 DIAGNOSIS — E11.65 TYPE 2 DIABETES MELLITUS WITH HYPERGLYCEMIA, WITH LONG-TERM CURRENT USE OF INSULIN (HCC): ICD-10-CM

## 2019-02-21 DIAGNOSIS — E78.00 PURE HYPERCHOLESTEROLEMIA: ICD-10-CM

## 2019-02-21 DIAGNOSIS — Z95.5 PRESENCE OF STENT IN LAD CORONARY ARTERY: ICD-10-CM

## 2019-02-21 DIAGNOSIS — Z79.4 TYPE 2 DIABETES MELLITUS WITH HYPERGLYCEMIA, WITH LONG-TERM CURRENT USE OF INSULIN (HCC): ICD-10-CM

## 2019-02-21 PROCEDURE — 99213 OFFICE O/P EST LOW 20 MIN: CPT | Performed by: NURSE PRACTITIONER

## 2019-02-21 RX ORDER — RANOLAZINE 500 MG/1
1000 TABLET, EXTENDED RELEASE ORAL 2 TIMES DAILY
Qty: 360 TABLET | Refills: 1 | Status: SHIPPED | OUTPATIENT
Start: 2019-02-21 | End: 2020-02-25 | Stop reason: SDUPTHER

## 2019-02-21 RX ORDER — RANOLAZINE 500 MG/1
500 TABLET, EXTENDED RELEASE ORAL 2 TIMES DAILY
Qty: 360 TABLET | Refills: 3 | Status: SHIPPED | OUTPATIENT
Start: 2019-02-21 | End: 2019-02-21 | Stop reason: SDUPTHER

## 2019-02-21 RX ORDER — AMOXICILLIN 250 MG
1 CAPSULE ORAL DAILY PRN
COMMUNITY

## 2019-02-21 NOTE — PROGRESS NOTES
Chief Complaint   Patient presents with   • Follow-up     Cardiac management. Last labs 2 months ago per PCP and Dr Carranza. She is to have colonoscopy.   • Med Refill     Needs refills on Ranexa-90 day.       Subjective       Sunday Mcclure is a 64 y.o. female with a history of HTN, hyperlipidemia, DM, and IHD diagnosed in July 2013 when she underwent thrombectomy of the LAD followed by atherectomy and stenting. In 2015, she was admitted to [facility] after syncopal episode.  Workup showed normal EF and disease of the JACQUES. Brain scan did not show acute changes.  She then underwent physical therapy at home and had no residual effects on memory or mobility. Given risk of silent ischemia and history of CAD repeat stress and carotid ultrasound recommended.  Carotid US showed <50% bilaterally. Stress showed apical infarct with jaime-infarct ischemia.  We initially tried medical management but chest pain persisted.  She underwent cath on 6/15/17 showing previously placed stent to the proximal to mid LAD was patent but 99% disease of mid to distal LAD noted which was stented x 2.  She also had disease of the RCA with acceptable FFR with medical management. More fatigue reported at last visit, stress repeated on 8/28/18 showed continued area of apical infarct with jaime-infarct ischemia. Plan to manage medically unless she had chest pain, then would go for cath. Carotid US repeated 8/28/18 showed <50% stenosis bilaterally. She came today for follow up. She feels well. Denies any cardiac symptoms today. No chest pain, no shortness of breath, no palpitations. Diabetes followed by Dr. Carranza with A1C last reported at 7.7%. She is undergoing PT for strengthening after she hurt her back.      HPI         Cardiac History:    Past Surgical History:   Procedure Laterality Date   • CARDIAC CATHETERIZATION  07/08/2013    EF 45%. 88% LAD- PTCA & Throbectomy. R/O Dissection    • CARDIAC CATHETERIZATION  07/24/2013    (   [facility]) 85% LAD- Atherectomy & 3.5x32 Promus    • CARDIAC CATHETERIZATION  06/15/2017    99% LAD- 2.25x12 & 2.25x28 Promus. 40% RCA- FFR- 0.89   • CARDIOVASCULAR STRESS TEST  08/05/2013     L. Myoview- ([facility]) EF 50% Septal Infarct with jaime-infarct ischemia    • CARDIOVASCULAR STRESS TEST  11/07/2016    EF 65%, apical infarct with jaime-infarct ischemia, continue current   • CARDIOVASCULAR STRESS TEST  08/28/2018    L. Cardiolite- Apical Infarct with PeriInfarct Ischemia.   • ECHO - CONVERTED  07/09/2013     ([facility]) EF 35%    • ECHO - CONVERTED  08/05/2013    ([facility]) EF 40%    • ECHO - CONVERTED  08/11/2015    ([facility]) - EF 55-60%    • ECHO - CONVERTED  11/07/2016    EF 60-65%, mild AR, mild MR   • ECHO - CONVERTED  08/28/2018    EF 65%. Mild MR & AI. RVSP- 20 mmHg.   • US CAROTID UNILATERAL  11/03/2016    Less than 50% bilaterally       Current Outpatient Medications   Medication Sig Dispense Refill   • acyclovir (ZOVIRAX) 400 MG tablet Take 400 mg by mouth Daily. Take no more than 5 doses a day.     • aspirin 81 MG EC tablet Take 1 tablet by mouth Daily. 90 tablet 3   • atorvastatin (LIPITOR) 20 MG tablet Take 20 mg by mouth Daily.     • Calcium Polycarbophil (FIBERTAB PO) Take 3 tablets by mouth Daily.     • insulin aspart (novoLOG) 100 UNIT/ML injection Per insulin pump as directed.     • LamoTRIgine (LAMICTAL XR PO) Take 300 mg by mouth Daily.     • levothyroxine (SYNTHROID) 88 MCG tablet Take 88 mcg by mouth Daily.     • nitroglycerin (NITROSTAT) 0.4 MG SL tablet Place 1 tablet under the tongue Every 5 (Five) Minutes As Needed for Chest Pain. Take no more than 3 doses in 15 minutes. 25 tablet 1   • prasugrel (EFFIENT) 10 MG tablet Take 1 tablet by mouth Daily. 90 tablet 3   • pregabalin (LYRICA) 150 MG capsule Take 150 mg by mouth 2 (Two) Times a Day.     • senna-docusate (PERICOLACE) 8.6-50 MG per tablet Take 1 tablet by mouth Daily As Needed for Constipation.     • venlafaxine (EFFEXOR) 75 MG tablet  Take 75 mg by mouth Daily.     • venlafaxine XR (EFFEXOR-XR) 150 MG 24 hr capsule Take 150 mg by mouth Daily.     • ranolazine (RANEXA) 500 MG 12 hr tablet Take 2 tablets by mouth 2 (Two) Times a Day. 360 tablet 1     No current facility-administered medications for this visit.        Patient has no known allergies.    Past Medical History:   Diagnosis Date   • MARIANA: Moderate to severe sensoral neuropathy 2015   • Anxiety    • Arthritis    • Breast cancer (CMS/HCC)     lumpectomy left breast, radiation   • DDD (degenerative disc disease), lumbar    • Diabetes (CMS/HCC)    • Diabetic neuropathy (CMS/HCC)    • H/O carpal tunnel repair     multiple surgeries   • H/O hand surgery     trigger finger   • H/O thyroidectomy     thyroid cancer   • H/O tubal ligation    • H/O:     • History of knee replacement     Left and right   • Hx of appendectomy    • Hypercholesterolemia    • Hypertension    • MRI head: No acute CVA seen 2015       Social History     Socioeconomic History   • Marital status:      Spouse name: Not on file   • Number of children: Not on file   • Years of education: Not on file   • Highest education level: Not on file   Social Needs   • Financial resource strain: Not on file   • Food insecurity - worry: Not on file   • Food insecurity - inability: Not on file   • Transportation needs - medical: Not on file   • Transportation needs - non-medical: Not on file   Occupational History   • Not on file   Tobacco Use   • Smoking status: Never Smoker   • Smokeless tobacco: Never Used   Substance and Sexual Activity   • Alcohol use: No   • Drug use: No   • Sexual activity: Not on file   Other Topics Concern   • Not on file   Social History Narrative   • Not on file       Family History   Problem Relation Age of Onset   • No Known Problems Mother    • Lymphoma Father    • Myasthenia gravis Sister        Review of Systems   Constitution: Negative for decreased appetite, weakness and  "malaise/fatigue.   HENT: Negative.    Eyes: Negative.    Cardiovascular: Negative for chest pain, dyspnea on exertion, leg swelling, orthopnea and palpitations.   Respiratory: Negative for cough and shortness of breath.    Endocrine: Negative.    Hematologic/Lymphatic: Negative.    Skin: Negative.    Musculoskeletal: Negative.    Gastrointestinal: Negative for abdominal pain and melena.   Genitourinary: Negative for dysuria and hematuria.   Neurological: Negative for dizziness.   Psychiatric/Behavioral: Negative for altered mental status and depression.   Allergic/Immunologic: Negative.       Diabetes- Yes  Thyroid-normal    Objective     /70 (BP Location: Left arm)   Pulse 64   Ht 154.9 cm (60.98\")   Wt 64 kg (141 lb)   BMI 26.66 kg/m²     Physical Exam   Constitutional: She is oriented to person, place, and time. She appears well-developed and well-nourished. No distress.   HENT:   Head: Normocephalic.   Eyes: Pupils are equal, round, and reactive to light.   Neck: Normal range of motion.   Cardiovascular: Normal rate, regular rhythm and intact distal pulses.   Murmur heard.  Pulmonary/Chest: Effort normal and breath sounds normal. No respiratory distress. She has no wheezes. She has no rales.   Abdominal: Soft. Bowel sounds are normal.   Musculoskeletal: Normal range of motion. She exhibits no edema.   Neurological: She is alert and oriented to person, place, and time.   Skin: Skin is warm and dry. She is not diaphoretic.   Psychiatric: She has a normal mood and affect.   Nursing note and vitals reviewed.     Procedures          Assessment/Plan    Heart rate and blood pressure are well controlled. Weight is stable. She remains asymptomatic, so we will delay cardiac cath. Continue medical management with Ranexa 1,000 mg BID. She will continue aspirin and Effient secondary to high risk for restenosis. Continue atorvastatin for hyperlipidemia. Lipids are followed with primary care and endocrine. A1C " slightly above goal at 7.7%, managed by endocrine. As long as she is having no symptoms, will continue to monitor only. She is aware to contact our office if she develops any new symptoms. Follow up in six months. She does have sl nitro for PRN use.     Sunday was seen today for follow-up and med refill.    Diagnoses and all orders for this visit:    Coronary artery disease involving native coronary artery of native heart without angina pectoris    Essential hypertension    Pure hypercholesterolemia    Presence of stent in LAD coronary artery    Type 2 diabetes mellitus with hyperglycemia, with long-term current use of insulin (CMS/[facility])    Other orders  -     Discontinue: ranolazine (RANEXA) 500 MG 12 hr tablet; Take 1 tablet by mouth 2 (Two) Times a Day.        Patient's Body mass index is 26.66 kg/m². BMI is above normal parameters. Recommendations include: nutrition counseling.               Electronically signed by SARAH Harrison,  February 22, 2019 8:14 AM

## 2019-02-21 NOTE — TELEPHONE ENCOUNTER
Pharmacy called requesting clarification on Ranexa 500mg script, per Elizabeth SMITH Ranexa 500mg 2 tablets bid. Clarification script for Ranexa 500mg 2 tablets bid sent to pharmacy.

## 2019-02-22 PROBLEM — E11.9 TYPE 2 DIABETES MELLITUS WITHOUT COMPLICATION, WITHOUT LONG-TERM CURRENT USE OF INSULIN (HCC): Status: RESOLVED | Noted: 2017-05-16 | Resolved: 2019-02-22

## 2019-02-22 PROBLEM — E11.9 TYPE 2 DIABETES MELLITUS, WITH LONG-TERM CURRENT USE OF INSULIN (HCC): Status: ACTIVE | Noted: 2019-02-22

## 2019-02-22 PROBLEM — Z79.4 TYPE 2 DIABETES MELLITUS, WITH LONG-TERM CURRENT USE OF INSULIN (HCC): Status: ACTIVE | Noted: 2019-02-22

## 2019-08-22 ENCOUNTER — OFFICE VISIT (OUTPATIENT)
Dept: CARDIOLOGY | Facility: CLINIC | Age: 64
End: 2019-08-22

## 2019-08-22 VITALS
DIASTOLIC BLOOD PRESSURE: 70 MMHG | SYSTOLIC BLOOD PRESSURE: 130 MMHG | HEART RATE: 68 BPM | HEIGHT: 61 IN | BODY MASS INDEX: 26.39 KG/M2 | WEIGHT: 139.8 LBS

## 2019-08-22 DIAGNOSIS — Z79.4 TYPE 2 DIABETES MELLITUS WITH HYPERGLYCEMIA, WITH LONG-TERM CURRENT USE OF INSULIN (HCC): ICD-10-CM

## 2019-08-22 DIAGNOSIS — E03.9 ACQUIRED HYPOTHYROIDISM: ICD-10-CM

## 2019-08-22 DIAGNOSIS — I25.9 IHD (ISCHEMIC HEART DISEASE): Primary | ICD-10-CM

## 2019-08-22 DIAGNOSIS — I10 ESSENTIAL HYPERTENSION: ICD-10-CM

## 2019-08-22 DIAGNOSIS — E78.00 PURE HYPERCHOLESTEROLEMIA: ICD-10-CM

## 2019-08-22 DIAGNOSIS — E11.65 TYPE 2 DIABETES MELLITUS WITH HYPERGLYCEMIA, WITH LONG-TERM CURRENT USE OF INSULIN (HCC): ICD-10-CM

## 2019-08-22 PROCEDURE — 99213 OFFICE O/P EST LOW 20 MIN: CPT | Performed by: NURSE PRACTITIONER

## 2019-08-22 RX ORDER — ASPIRIN 81 MG/1
81 TABLET ORAL DAILY
Qty: 90 TABLET | Refills: 3 | Status: SHIPPED | OUTPATIENT
Start: 2019-08-22 | End: 2020-02-25 | Stop reason: SDUPTHER

## 2019-08-22 RX ORDER — PRASUGREL 10 MG/1
10 TABLET, FILM COATED ORAL DAILY
Qty: 90 TABLET | Refills: 3 | Status: SHIPPED | OUTPATIENT
Start: 2019-08-22 | End: 2020-02-25 | Stop reason: SDUPTHER

## 2019-08-22 RX ORDER — FERROUS SULFATE TAB EC 324 MG (65 MG FE EQUIVALENT) 324 (65 FE) MG
325 TABLET DELAYED RESPONSE ORAL
COMMUNITY

## 2019-08-22 NOTE — PROGRESS NOTES
Chief Complaint   Patient presents with   • Follow-up     Cardiac management .Takes Aspirin 81 mg daily . No cardiac complaints today .  Had labs within the las 3 months per PCP.   • Med Refill     Needs refills on Effient and Aspirin 90 day supply to [facility]  . .Had medication list with her today .       Subjective       Sunday Mcclure is a 64 y.o. female  with a history of HTN, hyperlipidemia, DM, and IHD diagnosed in July 2013 when she underwent thrombectomy of the LAD followed by atherectomy and stenting. In 2015, she was admitted to [facility] after syncopal episode.  Workup showed normal EF and disease of the JACQUES. Brain scan did not show acute changes.  She then underwent physical therapy at home and had no residual effects on memory or mobility. Given risk of silent ischemia and history of CAD repeat stress and carotid ultrasound recommended.  Carotid US showed <50% bilaterally. Stress showed apical infarct with jaime-infarct ischemia.  We initially tried medical management but chest pain persisted.  She underwent cath on 6/15/17 showing previously placed stent to the proximal to mid LAD was patent but 99% disease of mid to distal LAD noted which was stented x 2.  She also had disease of the RCA with acceptable FFR with medical management. More fatigue reported at last visit, stress repeated on 8/28/18 showed continued area of apical infarct with jaime-infarct ischemia. Plan to manage medically unless she had chest pain, then would go for cath. Carotid US repeated 8/28/18 showed <50% stenosis bilaterally. Diabetes followed by Dr. Carranza.    Today she comes to the office for a follow-up visit.  No cardiac complaints or symptoms reported.  She is maintaining her normal daily activities without chest pain, dizziness, inappropriate shortness of breath, or palpitations.  No recent medication changes are noted.  She does follow with endocrinology and reports they are continuing to work with  her for better blood glucose control.  Thyroid has been stable.    HPI     Cardiac History:    Past Surgical History:   Procedure Laterality Date   • CARDIAC CATHETERIZATION  07/08/2013    EF 45%. 88% LAD- PTCA & Throbectomy. R/O Dissection    • CARDIAC CATHETERIZATION  07/24/2013    ( Dr. Singer) 85% LAD- Atherectomy & 3.5x32 Promus    • CARDIAC CATHETERIZATION  06/15/2017    99% LAD- 2.25x12 & 2.25x28 Promus. 40% RCA- FFR- 0.89   • CARDIOVASCULAR STRESS TEST  08/05/2013     L. Myoview- ([facility]) EF 50% Septal Infarct with jaime-infarct ischemia    • CARDIOVASCULAR STRESS TEST  11/07/2016    EF 65%, apical infarct with jaime-infarct ischemia, continue current   • CARDIOVASCULAR STRESS TEST  08/28/2018    L. Cardiolite- Apical Infarct with PeriInfarct Ischemia.   • ECHO - CONVERTED  07/09/2013     ([facility]) EF 35%    • ECHO - CONVERTED  08/05/2013    ([facility]) EF 40%    • ECHO - CONVERTED  08/11/2015    ([facility]) - EF 55-60%    • ECHO - CONVERTED  11/07/2016    EF 60-65%, mild AR, mild MR   • ECHO - CONVERTED  08/28/2018    EF 65%. Mild MR & AI. RVSP- 20 mmHg.   • US CAROTID UNILATERAL  11/03/2016    Less than 50% bilaterally       Current Outpatient Medications   Medication Sig Dispense Refill   • acyclovir (ZOVIRAX) 400 MG tablet Take 400 mg by mouth Daily. Take no more than 5 doses a day.     • aspirin 81 MG EC tablet Take 1 tablet by mouth Daily. 90 tablet 3   • atorvastatin (LIPITOR) 20 MG tablet Take 20 mg by mouth Daily.     • Calcium Polycarbophil (FIBERTAB PO) Take 3 tablets by mouth Daily.     • ferrous sulfate 324 (65 Fe) MG tablet delayed-release EC tablet Take 324 mg by mouth Daily With Breakfast.     • insulin aspart (novoLOG) 100 UNIT/ML injection Per insulin pump as directed.     • LamoTRIgine (LAMICTAL XR PO) Take 300 mg by mouth Daily.     • levothyroxine (SYNTHROID) 88 MCG tablet Take 88 mcg by mouth Daily.     • nitroglycerin (NITROSTAT) 0.4 MG SL tablet Place 1 tablet under the tongue Every 5 (Five)  Minutes As Needed for Chest Pain. Take no more than 3 doses in 15 minutes. 25 tablet 1   • prasugrel (EFFIENT) 10 MG tablet Take 1 tablet by mouth Daily. 90 tablet 3   • pregabalin (LYRICA) 150 MG capsule Take 150 mg by mouth 2 (Two) Times a Day.     • ranolazine (RANEXA) 500 MG 12 hr tablet Take 2 tablets by mouth 2 (Two) Times a Day. 360 tablet 1   • senna-docusate (PERICOLACE) 8.6-50 MG per tablet Take 1 tablet by mouth Daily As Needed for Constipation.     • venlafaxine (EFFEXOR) 75 MG tablet Take 75 mg by mouth Daily.     • venlafaxine XR (EFFEXOR-XR) 150 MG 24 hr capsule Take 150 mg by mouth Daily.       No current facility-administered medications for this visit.        Patient has no known allergies.    Past Medical History:   Diagnosis Date   • MARIANA: Moderate to severe sensoral neuropathy 2015   • Anxiety    • Arthritis    • Breast cancer (CMS/HCC)     lumpectomy left breast, radiation   • DDD (degenerative disc disease), lumbar    • Diabetes (CMS/HCC)    • Diabetic neuropathy (CMS/HCC)    • H/O carpal tunnel repair     multiple surgeries   • H/O hand surgery     trigger finger   • H/O thyroidectomy     thyroid cancer   • H/O tubal ligation    • H/O:     • History of knee replacement     Left and right   • Hx of appendectomy    • Hypercholesterolemia    • Hypertension    • MRI head: No acute CVA seen 2015       Social History     Socioeconomic History   • Marital status:      Spouse name: Not on file   • Number of children: Not on file   • Years of education: Not on file   • Highest education level: Not on file   Tobacco Use   • Smoking status: Never Smoker   • Smokeless tobacco: Never Used   Substance and Sexual Activity   • Alcohol use: No   • Drug use: No       Family History   Problem Relation Age of Onset   • No Known Problems Mother    • Lymphoma Father    • Myasthenia gravis Sister        Review of Systems   Constitution: Negative for decreased appetite and  "malaise/fatigue.   HENT: Negative for congestion, hoarse voice and nosebleeds.    Eyes: Negative for blurred vision and redness.   Cardiovascular: Negative for chest pain, irregular heartbeat, leg swelling, near-syncope and palpitations.   Respiratory: Negative for cough and shortness of breath.    Endocrine: Negative for cold intolerance and heat intolerance.   Hematologic/Lymphatic: Negative for bleeding problem. Does not bruise/bleed easily.   Skin: Negative for color change, dry skin and itching.   Musculoskeletal: Negative for muscle cramps, muscle weakness and myalgias.   Gastrointestinal: Negative for abdominal pain, change in bowel habit, dysphagia, heartburn, melena and nausea.   Genitourinary: Negative for dysuria and hematuria.   Neurological: Negative for dizziness and light-headedness.   Psychiatric/Behavioral: Negative for altered mental status. The patient does not have insomnia.    Allergic/Immunologic: Negative for hives and persistent infections.        Objective     /70 (BP Location: Left arm)   Pulse 68   Ht 154.9 cm (60.98\")   Wt 63.4 kg (139 lb 12.8 oz)   BMI 26.43 kg/m²     Physical Exam   Constitutional: She is oriented to person, place, and time. She appears well-nourished.   HENT:   Head: Normocephalic.   Eyes: Conjunctivae are normal. Pupils are equal, round, and reactive to light.   Neck: Normal range of motion. Neck supple. Carotid bruit is not present.   Cardiovascular: Normal rate, regular rhythm, S1 normal and S2 normal.   No murmur heard.  Pulmonary/Chest: Breath sounds normal. She has no wheezes. She has no rales.   Abdominal: Soft. Bowel sounds are normal. She exhibits no distension. There is no tenderness.   Musculoskeletal: Normal range of motion. She exhibits no edema.   Neurological: She is alert and oriented to person, place, and time.   Skin: Skin is warm and dry.   Psychiatric: She has a normal mood and affect. Her behavior is normal.        Procedures: none today "         Assessment/Plan      Sunday was seen today for follow-up and med refill.    Diagnoses and all orders for this visit:    IHD (ischemic heart disease)  -     prasugrel (EFFIENT) 10 MG tablet; Take 1 tablet by mouth Daily.  -     aspirin 81 MG EC tablet; Take 1 tablet by mouth Daily.    Essential hypertension    Pure hypercholesterolemia    Type 2 diabetes mellitus with hyperglycemia, with long-term current use of insulin (CMS/[facility])    Acquired hypothyroidism    IHD- stress test done last year showed small area of ischemia which is being managed medically.  She is taking Ranexa 500 mg 2 tablets twice a day as she finds the 500 mg tablets easier to swallow.  She continues to take Effient and aspirin therapy without issue.  Refills given. No cardiac symptoms or concerns indicated therefore, we will continue to medical management with plan for elective cardiac catheterization should problems develop.     HTN-her blood pressure today is normal.  No recent blood pressure elevations or hypotension noted.  Continue to monitor.    Hypercholesterolemia- on Lipitor without issue.  She follows with PCP and endocrinologist for lab orders.  Please forward a copy of lab results as available.    Patient's Body mass index is 26.43 kg/m². BMI is above normal parameters. Recommendations include: nutrition counseling.  She maintains good diabetic diet.  I encouraged her efforts.    DM/hypothyroidism- managed by Dr. Carranza.     From a cardiac standpoint Ms. Mcclure appears stable at this time.  A 6-month follow-up visit scheduled.  Please call sooner for any cardiac concerns.           Electronically signed by SARAH Trinh,  August 22, 2019 2:11 PM

## 2019-11-14 ENCOUNTER — TELEPHONE (OUTPATIENT)
Dept: CARDIOLOGY | Facility: CLINIC | Age: 64
End: 2019-11-14

## 2019-11-14 NOTE — TELEPHONE ENCOUNTER
SARAH Herbert seen patient today for cough and CXR was done.  They faxed report for review.  CXR scanned.  (impression reports significant appearing stenosis within the mid LAD stent).....      I called patient.  She denies any angina.  She has had cough and congestion for about a week and reports she feels bad from that.  PCP started her on levaquin, prednisone, cough syrup, and nebulizer treatments today.      Dr. LIMON took report to talk to Dr. Moffett.

## 2019-11-15 NOTE — TELEPHONE ENCOUNTER
Dr. Santillan,   Stress test 8/28/2018 showed apical infarct with jaime-infarct ischemia. Plan was medication management with cath if develops symptoms. She has significant cardiac history and risk (diabetic), but she remains asymptomatic. Would you advise to repeat stress or proceed with cath?

## 2019-11-18 NOTE — TELEPHONE ENCOUNTER
Patient aware of recommendations to have stress test once her respiratory illness has improved.  She agrees to call our office once she is better.

## 2019-11-18 NOTE — TELEPHONE ENCOUNTER
April,   Please advise patient recommend stress test once respiratory illness has improved. Please advise to call the office to schedule once she is better. Thank you.

## 2019-11-25 ENCOUNTER — TELEPHONE (OUTPATIENT)
Dept: CARDIOLOGY | Facility: CLINIC | Age: 64
End: 2019-11-25

## 2019-11-25 DIAGNOSIS — I25.118 CORONARY ARTERY DISEASE INVOLVING NATIVE CORONARY ARTERY OF NATIVE HEART WITH OTHER FORM OF ANGINA PECTORIS (HCC): Primary | ICD-10-CM

## 2019-11-27 ENCOUNTER — HOSPITAL ENCOUNTER (OUTPATIENT)
Dept: CARDIOLOGY | Facility: HOSPITAL | Age: 64
Discharge: HOME | End: 2019-11-27

## 2019-11-27 VITALS — BODY MASS INDEX: 26.39 KG/M2 | HEIGHT: 61 IN | WEIGHT: 139.77 LBS

## 2019-11-27 DIAGNOSIS — I25.118 CORONARY ARTERY DISEASE INVOLVING NATIVE CORONARY ARTERY OF NATIVE HEART WITH OTHER FORM OF ANGINA PECTORIS (HCC): ICD-10-CM

## 2019-11-27 LAB
BH CV ECHO MEAS - ACS: 1.8 CM
BH CV ECHO MEAS - AI DEC SLOPE: 164 CM/SEC^2
BH CV ECHO MEAS - AI MAX PG: 24.4 MMHG
BH CV ECHO MEAS - AI MAX VEL: 247 CM/SEC
BH CV ECHO MEAS - AI P1/2T: 441.1 MSEC
BH CV ECHO MEAS - AO MAX PG (FULL): 0.5 MMHG
BH CV ECHO MEAS - AO MAX PG: 3.7 MMHG
BH CV ECHO MEAS - AO MEAN PG (FULL): 1 MMHG
BH CV ECHO MEAS - AO MEAN PG: 2 MMHG
BH CV ECHO MEAS - AO ROOT AREA (BSA CORRECTED): 1.9
BH CV ECHO MEAS - AO ROOT AREA: 7.1 CM^2
BH CV ECHO MEAS - AO ROOT DIAM: 3 CM
BH CV ECHO MEAS - AO V2 MAX: 96.1 CM/SEC
BH CV ECHO MEAS - AO V2 MEAN: 66.3 CM/SEC
BH CV ECHO MEAS - AO V2 VTI: 21.3 CM
BH CV ECHO MEAS - BSA(HAYCOCK): 1.7 M^2
BH CV ECHO MEAS - BSA: 1.6 M^2
BH CV ECHO MEAS - BZI_BMI: 26.3 KILOGRAMS/M^2
BH CV ECHO MEAS - BZI_METRIC_HEIGHT: 154.9 CM
BH CV ECHO MEAS - BZI_METRIC_WEIGHT: 63.1 KG
BH CV ECHO MEAS - EDV(CUBED): 65.9 ML
BH CV ECHO MEAS - EDV(TEICH): 71.7 ML
BH CV ECHO MEAS - EF(CUBED): 77.1 %
BH CV ECHO MEAS - EF(TEICH): 69.8 %
BH CV ECHO MEAS - ESV(CUBED): 15.1 ML
BH CV ECHO MEAS - ESV(TEICH): 21.7 ML
BH CV ECHO MEAS - FS: 38.9 %
BH CV ECHO MEAS - IVS/LVPW: 0.91
BH CV ECHO MEAS - IVSD: 1 CM
BH CV ECHO MEAS - LA DIMENSION: 3.5 CM
BH CV ECHO MEAS - LA/AO: 1.2
BH CV ECHO MEAS - LAT PEAK E' VEL: 4.9 CM/SEC
BH CV ECHO MEAS - LV IVRT: 0.12 SEC
BH CV ECHO MEAS - LV MASS(C)D: 140.2 GRAMS
BH CV ECHO MEAS - LV MASS(C)DI: 86.6 GRAMS/M^2
BH CV ECHO MEAS - LV MAX PG: 3.2 MMHG
BH CV ECHO MEAS - LV MEAN PG: 1 MMHG
BH CV ECHO MEAS - LV V1 MAX: 89.4 CM/SEC
BH CV ECHO MEAS - LV V1 MEAN: 55.7 CM/SEC
BH CV ECHO MEAS - LV V1 VTI: 21 CM
BH CV ECHO MEAS - LVIDD: 4 CM
BH CV ECHO MEAS - LVIDS: 2.5 CM
BH CV ECHO MEAS - LVPWD: 1.1 CM
BH CV ECHO MEAS - MED PEAK E' VEL: 4.4 CM/SEC
BH CV ECHO MEAS - MV A MAX VEL: 91.2 CM/SEC
BH CV ECHO MEAS - MV DEC SLOPE: 482 CM/SEC^2
BH CV ECHO MEAS - MV DEC TIME: 0.16 SEC
BH CV ECHO MEAS - MV E MAX VEL: 71 CM/SEC
BH CV ECHO MEAS - MV E/A: 0.78
BH CV ECHO MEAS - MV MAX PG: 3.9 MMHG
BH CV ECHO MEAS - MV MEAN PG: 2 MMHG
BH CV ECHO MEAS - MV P1/2T MAX VEL: 86 CM/SEC
BH CV ECHO MEAS - MV P1/2T: 52.3 MSEC
BH CV ECHO MEAS - MV V2 MAX: 99 CM/SEC
BH CV ECHO MEAS - MV V2 MEAN: 59.5 CM/SEC
BH CV ECHO MEAS - MV V2 VTI: 32 CM
BH CV ECHO MEAS - MVA P1/2T LCG: 2.6 CM^2
BH CV ECHO MEAS - MVA(P1/2T): 4.2 CM^2
BH CV ECHO MEAS - PA MAX PG (FULL): 1 MMHG
BH CV ECHO MEAS - PA MAX PG: 2.9 MMHG
BH CV ECHO MEAS - PA MEAN PG (FULL): 1 MMHG
BH CV ECHO MEAS - PA MEAN PG: 2 MMHG
BH CV ECHO MEAS - PA V2 MAX: 84.6 CM/SEC
BH CV ECHO MEAS - PA V2 MEAN: 58.2 CM/SEC
BH CV ECHO MEAS - PA V2 VTI: 18.4 CM
BH CV ECHO MEAS - RV MAX PG: 1.8 MMHG
BH CV ECHO MEAS - RV MEAN PG: 1 MMHG
BH CV ECHO MEAS - RV V1 MAX: 67.9 CM/SEC
BH CV ECHO MEAS - RV V1 MEAN: 45.9 CM/SEC
BH CV ECHO MEAS - RV V1 VTI: 15.4 CM
BH CV ECHO MEAS - RVDD: 2.5 CM
BH CV ECHO MEAS - SI(AO): 93 ML/M^2
BH CV ECHO MEAS - SI(CUBED): 31.4 ML/M^2
BH CV ECHO MEAS - SI(TEICH): 30.9 ML/M^2
BH CV ECHO MEAS - SV(AO): 150.6 ML
BH CV ECHO MEAS - SV(CUBED): 50.9 ML
BH CV ECHO MEAS - SV(TEICH): 50 ML
BH CV ECHO MEASUREMENTS AVERAGE E/E' RATIO: 15.27
BH CV STRESS COMMENTS STAGE 1: NORMAL
BH CV STRESS DOSE REGADENOSON STAGE 1: 0.4
BH CV STRESS DURATION MIN STAGE 1: 0
BH CV STRESS DURATION SEC STAGE 1: 10
BH CV STRESS PROTOCOL 1: NORMAL
BH CV STRESS RECOVERY BP: NORMAL MMHG
BH CV STRESS RECOVERY HR: 70 BPM
BH CV STRESS STAGE 1: 1
LV EF NUC BP: 64 %
MAXIMAL PREDICTED HEART RATE: 158 BPM
MAXIMAL PREDICTED HEART RATE: 158 BPM
PERCENT MAX PREDICTED HR: 43.04 %
STRESS BASELINE BP: NORMAL MMHG
STRESS BASELINE HR: 68 BPM
STRESS PERCENT HR: 51 %
STRESS POST PEAK BP: NORMAL MMHG
STRESS POST PEAK HR: 68 BPM
STRESS TARGET HR: 134 BPM
STRESS TARGET HR: 134 BPM

## 2019-11-27 PROCEDURE — 93017 CV STRESS TEST TRACING ONLY: CPT

## 2019-11-27 PROCEDURE — 0 TECHNETIUM SESTAMIBI: Performed by: INTERNAL MEDICINE

## 2019-11-27 PROCEDURE — 93306 TTE W/DOPPLER COMPLETE: CPT | Performed by: INTERNAL MEDICINE

## 2019-11-27 PROCEDURE — 78452 HT MUSCLE IMAGE SPECT MULT: CPT

## 2019-11-27 PROCEDURE — 93016 CV STRESS TEST SUPVJ ONLY: CPT | Performed by: NURSE PRACTITIONER

## 2019-11-27 PROCEDURE — 78452 HT MUSCLE IMAGE SPECT MULT: CPT | Performed by: INTERNAL MEDICINE

## 2019-11-27 PROCEDURE — 93306 TTE W/DOPPLER COMPLETE: CPT

## 2019-11-27 PROCEDURE — 25010000002 REGADENOSON 0.4 MG/5ML SOLUTION: Performed by: INTERNAL MEDICINE

## 2019-11-27 PROCEDURE — A9500 TC99M SESTAMIBI: HCPCS | Performed by: INTERNAL MEDICINE

## 2019-11-27 PROCEDURE — 93018 CV STRESS TEST I&R ONLY: CPT | Performed by: INTERNAL MEDICINE

## 2019-11-27 RX ADMIN — REGADENOSON 0.4 MG: 0.08 INJECTION, SOLUTION INTRAVENOUS at 12:26

## 2019-11-27 RX ADMIN — TECHNETIUM TC 99M SESTAMIBI 1 DOSE: 1 INJECTION INTRAVENOUS at 12:25

## 2019-11-27 RX ADMIN — TECHNETIUM TC 99M SESTAMIBI 1 DOSE: 1 INJECTION INTRAVENOUS at 12:26

## 2020-02-25 ENCOUNTER — OFFICE VISIT (OUTPATIENT)
Dept: CARDIOLOGY | Facility: CLINIC | Age: 65
End: 2020-02-25

## 2020-02-25 VITALS
HEIGHT: 61 IN | WEIGHT: 139.8 LBS | DIASTOLIC BLOOD PRESSURE: 60 MMHG | HEART RATE: 76 BPM | SYSTOLIC BLOOD PRESSURE: 118 MMHG | BODY MASS INDEX: 26.39 KG/M2

## 2020-02-25 DIAGNOSIS — I25.118 CORONARY ARTERY DISEASE OF NATIVE ARTERY OF NATIVE HEART WITH STABLE ANGINA PECTORIS (HCC): Primary | ICD-10-CM

## 2020-02-25 DIAGNOSIS — I25.9 IHD (ISCHEMIC HEART DISEASE): ICD-10-CM

## 2020-02-25 DIAGNOSIS — E11.59 TYPE 2 DIABETES MELLITUS WITH OTHER CIRCULATORY COMPLICATION, WITH LONG-TERM CURRENT USE OF INSULIN (HCC): ICD-10-CM

## 2020-02-25 DIAGNOSIS — Z95.5 PRESENCE OF STENT IN LAD CORONARY ARTERY: ICD-10-CM

## 2020-02-25 DIAGNOSIS — Z79.4 TYPE 2 DIABETES MELLITUS WITH OTHER CIRCULATORY COMPLICATION, WITH LONG-TERM CURRENT USE OF INSULIN (HCC): ICD-10-CM

## 2020-02-25 DIAGNOSIS — E78.00 PURE HYPERCHOLESTEROLEMIA: ICD-10-CM

## 2020-02-25 DIAGNOSIS — I10 ESSENTIAL HYPERTENSION: ICD-10-CM

## 2020-02-25 PROCEDURE — 99214 OFFICE O/P EST MOD 30 MIN: CPT | Performed by: NURSE PRACTITIONER

## 2020-02-25 RX ORDER — ASPIRIN 81 MG/1
81 TABLET ORAL DAILY
Qty: 90 TABLET | Refills: 3 | Status: SHIPPED | OUTPATIENT
Start: 2020-02-25 | End: 2021-03-18 | Stop reason: SDUPTHER

## 2020-02-25 RX ORDER — OXYBUTYNIN CHLORIDE 5 MG/1
5 TABLET ORAL DAILY
COMMUNITY
End: 2021-01-01

## 2020-02-25 RX ORDER — PRASUGREL 10 MG/1
10 TABLET, FILM COATED ORAL DAILY
Qty: 90 TABLET | Refills: 3 | Status: SHIPPED | OUTPATIENT
Start: 2020-02-25 | End: 2020-08-25 | Stop reason: SDUPTHER

## 2020-02-25 RX ORDER — PREGABALIN 100 MG/1
200 CAPSULE ORAL NIGHTLY
COMMUNITY
End: 2021-04-19 | Stop reason: ALTCHOICE

## 2020-02-25 RX ORDER — ATORVASTATIN CALCIUM 20 MG/1
20 TABLET, FILM COATED ORAL DAILY
Qty: 90 TABLET | Refills: 3 | Status: SHIPPED | OUTPATIENT
Start: 2020-02-25 | End: 2020-08-25 | Stop reason: SDUPTHER

## 2020-02-25 RX ORDER — ISOSORBIDE MONONITRATE 30 MG/1
30 TABLET, EXTENDED RELEASE ORAL EVERY MORNING
Qty: 30 TABLET | Refills: 11 | Status: SHIPPED | OUTPATIENT
Start: 2020-02-25 | End: 2020-08-25 | Stop reason: SDUPTHER

## 2020-02-25 RX ORDER — RANOLAZINE 500 MG/1
1000 TABLET, EXTENDED RELEASE ORAL 2 TIMES DAILY
Qty: 180 TABLET | Refills: 3 | Status: SHIPPED | OUTPATIENT
Start: 2020-02-25 | End: 2020-08-25 | Stop reason: SDUPTHER

## 2020-02-25 RX ORDER — SENNA PLUS 8.6 MG/1
2 TABLET ORAL NIGHTLY
COMMUNITY
End: 2022-01-01

## 2020-02-25 NOTE — PROGRESS NOTES
Chief Complaint   Patient presents with   • Follow-up     Cardiac management.   • Lab     Last labs couple months ago per PCP.   • Shortness of Breath     Only if walking on treadmill.   • Med Refill     Needs refills on all cardiac medication and Nitro sl-90 day.     Subjective       Sunday Mcclure is a 65 y.o. female with HTN, hyperlipidemia, DM, and IHD diagnosed in July 2013 when she underwent thrombectomy of the LAD followed by atherectomy and stenting. In 2015, she was admitted to [facility] after syncopal episode.  Workup showed normal EF and disease of the JACQUES. Brain scan did not show acute changes.  She then underwent physical therapy at home and had no residual effects on memory or mobility. Repeat cath on 6/15/17 showing previously placed stent to the proximal to mid LAD was patent but 99% disease of mid to distal LAD noted which was stented x 2.  She also had disease of the RCA with acceptable FFR with medical management. Lexiscan repeated in 2018 as well as 11/2019 for stable angina and CXR apparently showing significant LAD stent stenosis showed similar findings of apical infarct/jaime-infarct ischemia. Plan for cath and atherectomy if CP persisted. She came today for follow up. She continues to report exertional angina, none at rest. No radiation, no diaphoresis. If she walks up steps or on treadmill, she will get tightness and pressure which subsides with rest. She has associated dyspnea on exertion. Diabetes managed by Dr. Carranza. Labs followed per PCP.     HPI         Cardiac History:    Past Surgical History:   Procedure Laterality Date   • CARDIAC CATHETERIZATION  07/08/2013    EF 45%. 88% LAD- PTCA & Throbectomy. R/O Dissection    • CARDIAC CATHETERIZATION  07/24/2013    ( Dr. Singer) 85% LAD- Atherectomy & 3.5x32 Promus    • CARDIAC CATHETERIZATION  06/15/2017    99% LAD- 2.25x12 & 2.25x28 Promus. 40% RCA- FFR- 0.89   • CARDIOVASCULAR STRESS TEST  08/05/2013     L. Myoview- ([facility]) EF 50%  Septal Infarct with jaime-infarct ischemia    • CARDIOVASCULAR STRESS TEST  11/07/2016    EF 65%, apical infarct with jaime-infarct ischemia, continue current   • CARDIOVASCULAR STRESS TEST  08/28/2018    L. Cardiolite- Apical Infarct with PeriInfarct Ischemia.   • CARDIOVASCULAR STRESS TEST  11/27/2019    L. Cardiolite- EF 64%. Apical Infarct with periinfarct Ischemia.   • ECHO - CONVERTED  07/09/2013     ([facility]) EF 35%    • ECHO - CONVERTED  08/05/2013    ([facility]) EF 40%    • ECHO - CONVERTED  08/11/2015    ([facility]) - EF 55-60%    • ECHO - CONVERTED  11/07/2016    EF 60-65%, mild AR, mild MR   • ECHO - CONVERTED  08/28/2018    EF 65%. Mild MR & AI. RVSP- 20 mmHg.   • ECHO - CONVERTED  11/27/2019    EF 65%. Apical WMA. Mild MR & AI.   • US CAROTID UNILATERAL  11/03/2016    Less than 50% bilaterally     Current Outpatient Medications   Medication Sig Dispense Refill   • acyclovir (ZOVIRAX) 400 MG tablet Take 400 mg by mouth Daily. Take no more than 5 doses a day.     • aspirin 81 MG EC tablet Take 1 tablet by mouth Daily. 90 tablet 3   • atorvastatin (LIPITOR) 20 MG tablet Take 1 tablet by mouth Daily. 90 tablet 3   • Calcium Polycarbophil (FIBERTAB PO) Take 3 tablets by mouth Daily.     • ferrous sulfate 324 (65 Fe) MG tablet delayed-release EC tablet Take 324 mg by mouth Daily With Breakfast.     • insulin aspart (novoLOG) 100 UNIT/ML injection Per insulin pump as directed.     • LamoTRIgine (LAMICTAL XR PO) Take 300 mg by mouth Daily.     • levothyroxine (SYNTHROID) 88 MCG tablet Take 88 mcg by mouth Daily.     • nitroglycerin (NITROSTAT) 0.4 MG SL tablet Place 1 tablet under the tongue Every 5 (Five) Minutes As Needed for Chest Pain. Take no more than 3 doses in 15 minutes. 25 tablet 1   • oxybutynin (DITROPAN) 5 MG tablet Take 5 mg by mouth Daily.     • prasugrel (EFFIENT) 10 MG tablet Take 1 tablet by mouth Daily. 90 tablet 3   • pregabalin (LYRICA) 100 MG capsule Take 200 mg by mouth Every Night.     •  pregabalin (LYRICA) 150 MG capsule Take 150 mg by mouth Daily.     • ranolazine (RANEXA) 500 MG 12 hr tablet Take 2 tablets by mouth 2 (Two) Times a Day. 180 tablet 3   • senna (SENEXON) 8.6 MG tablet Take 2 tablets by mouth Every Night.     • senna-docusate (PERICOLACE) 8.6-50 MG per tablet Take 1 tablet by mouth Daily As Needed for Constipation.     • venlafaxine (EFFEXOR) 75 MG tablet Take 75 mg by mouth Daily.     • venlafaxine XR (EFFEXOR-XR) 150 MG 24 hr capsule Take 150 mg by mouth Daily.     • isosorbide mononitrate (IMDUR) 30 MG 24 hr tablet Take 1 tablet by mouth Every Morning. 30 tablet 11     No current facility-administered medications for this visit.      Patient has no known allergies.    Past Medical History:   Diagnosis Date   • MARIANA: Moderate to severe sensoral neuropathy 2015   • Anxiety    • Arthritis    • Breast cancer (CMS/HCC)     lumpectomy left breast, radiation   • DDD (degenerative disc disease), lumbar    • Diabetes (CMS/HCC)    • Diabetic neuropathy (CMS/HCC)    • H/O carpal tunnel repair     multiple surgeries   • H/O hand surgery     trigger finger   • H/O thyroidectomy     thyroid cancer   • H/O tubal ligation    • H/O:     • History of knee replacement     Left and right   • Hx of appendectomy    • Hypercholesterolemia    • Hypertension    • MRI head: No acute CVA seen 2015     Social History     Socioeconomic History   • Marital status:      Spouse name: Not on file   • Number of children: Not on file   • Years of education: Not on file   • Highest education level: Not on file   Tobacco Use   • Smoking status: Never Smoker   • Smokeless tobacco: Never Used   Substance and Sexual Activity   • Alcohol use: No   • Drug use: No       Family History   Problem Relation Age of Onset   • No Known Problems Mother    • Lymphoma Father    • Myasthenia gravis Sister      Review of Systems   Constitution: Positive for malaise/fatigue. Negative for decreased appetite,  "weight gain and weight loss.   HENT: Negative.    Eyes: Negative.    Cardiovascular: Positive for chest pain and dyspnea on exertion. Negative for leg swelling, orthopnea, palpitations and syncope.   Respiratory: Positive for shortness of breath. Negative for cough.    Endocrine: Negative.    Hematologic/Lymphatic: Negative.    Skin: Negative.    Musculoskeletal: Negative for falls and myalgias.   Gastrointestinal: Negative for abdominal pain and melena.   Genitourinary: Negative for hematuria.   Neurological: Positive for numbness (diabetic neuropathy ). Negative for dizziness.   Psychiatric/Behavioral: Negative for altered mental status and depression.   Allergic/Immunologic: Negative.       Diabetes- Yes  Thyroid-normal    Objective     /60 (BP Location: Right arm)   Pulse 76   Ht 154 cm (60.63\")   Wt 63.4 kg (139 lb 12.8 oz)   BMI 26.74 kg/m²     Physical Exam   Constitutional: She is oriented to person, place, and time. She appears well-developed and well-nourished. No distress.   HENT:   Head: Normocephalic.   Eyes: Pupils are equal, round, and reactive to light.   Neck: Normal range of motion.   Cardiovascular: Normal rate, regular rhythm and intact distal pulses.   Murmur heard.  Pulmonary/Chest: Effort normal and breath sounds normal. No respiratory distress. She has no wheezes. She has no rales.   Abdominal: Soft. Bowel sounds are normal.   Musculoskeletal: Normal range of motion. She exhibits no edema.   Neurological: She is alert and oriented to person, place, and time.   Skin: Skin is warm and dry. She is not diaphoretic.   Psychiatric: She has a normal mood and affect.   Nursing note and vitals reviewed.     Procedures          Assessment/Plan      Sunday was seen today for follow-up, lab, shortness of breath and med refill.    Diagnoses and all orders for this visit:    Coronary artery disease of native artery of native heart with stable angina pectoris (CMS/HCC)  -     isosorbide " mononitrate (IMDUR) 30 MG 24 hr tablet; Take 1 tablet by mouth Every Morning.  -     ranolazine (RANEXA) 500 MG 12 hr tablet; Take 2 tablets by mouth 2 (Two) Times a Day.    IHD (ischemic heart disease)  -     isosorbide mononitrate (IMDUR) 30 MG 24 hr tablet; Take 1 tablet by mouth Every Morning.  -     ranolazine (RANEXA) 500 MG 12 hr tablet; Take 2 tablets by mouth 2 (Two) Times a Day.  -     prasugrel (EFFIENT) 10 MG tablet; Take 1 tablet by mouth Daily.  -     aspirin 81 MG EC tablet; Take 1 tablet by mouth Daily.    Essential hypertension    Pure hypercholesterolemia  -     atorvastatin (LIPITOR) 20 MG tablet; Take 1 tablet by mouth Daily.    Presence of stent in LAD coronary artery  -     isosorbide mononitrate (IMDUR) 30 MG 24 hr tablet; Take 1 tablet by mouth Every Morning.  -     prasugrel (EFFIENT) 10 MG tablet; Take 1 tablet by mouth Daily.  -     aspirin 81 MG EC tablet; Take 1 tablet by mouth Daily.    Type 2 diabetes mellitus with other circulatory complication, with long-term current use of insulin (CMS/[facility])    1. CAD with stable angina- continues to have exertion angina. We reviewed reports from stress test showing apical infarct with jaime-infarct ischemia. She would like to try medical management before undergoing any procedure. Will add Imdur 30 mg to her Ranexa which has been maximized. If the pain does not improve, she will consider undergoing cardiac cath and possible atherectomy and stenting. She will call the office in 1-2 weeks to report response to long-acting nitrates. Continue Effient, aspirin, statin, Ranexa, and Imdur.     2. HTN- well controlled with antihypertensive. She does not appear to be on ACE. BMI borderline. Continue low sodium diet.     3. Hyperlipidemia- lipids managed with atorvastatin, well tolerated. Lipids followed by PCP. Could we get copy of her next labs?     4. Diabetes- managed by endocrinology. She reports A1C has been well controlled recently with current regimen.  Labs not available. She is on aspirin and statin. No ACE.     Imdur added today for chronic, stable angina. Further recommendations to follow response. If CP persists, she has been advised to proceed with cath and possible intervention.     Patient's Body mass index is 26.74 kg/m². BMI is above normal parameters. Recommendations include: nutrition counseling.                 Electronically signed by SARAH Harrison,  February 28, 2020 8:50 AM

## 2020-02-28 PROBLEM — I25.118 CORONARY ARTERY DISEASE OF NATIVE ARTERY OF NATIVE HEART WITH STABLE ANGINA PECTORIS (HCC): Status: ACTIVE | Noted: 2017-05-16

## 2020-03-23 DIAGNOSIS — Z95.5 PRESENCE OF STENT IN LAD CORONARY ARTERY: ICD-10-CM

## 2020-03-23 DIAGNOSIS — I25.9 IHD (ISCHEMIC HEART DISEASE): ICD-10-CM

## 2020-03-23 RX ORDER — PRASUGREL 10 MG/1
TABLET, FILM COATED ORAL
Qty: 90 TABLET | Refills: 3 | OUTPATIENT
Start: 2020-03-23

## 2020-08-25 ENCOUNTER — OFFICE VISIT (OUTPATIENT)
Dept: CARDIOLOGY | Facility: CLINIC | Age: 65
End: 2020-08-25

## 2020-08-25 VITALS
DIASTOLIC BLOOD PRESSURE: 64 MMHG | HEART RATE: 76 BPM | BODY MASS INDEX: 25.68 KG/M2 | SYSTOLIC BLOOD PRESSURE: 120 MMHG | HEIGHT: 61 IN | TEMPERATURE: 99 F | WEIGHT: 136 LBS

## 2020-08-25 DIAGNOSIS — E78.00 PURE HYPERCHOLESTEROLEMIA: ICD-10-CM

## 2020-08-25 DIAGNOSIS — E10.69 TYPE 1 DIABETES MELLITUS WITH OTHER SPECIFIED COMPLICATION (HCC): ICD-10-CM

## 2020-08-25 DIAGNOSIS — I25.9 IHD (ISCHEMIC HEART DISEASE): ICD-10-CM

## 2020-08-25 DIAGNOSIS — E66.3 OVERWEIGHT: ICD-10-CM

## 2020-08-25 DIAGNOSIS — Z95.5 PRESENCE OF STENT IN LAD CORONARY ARTERY: ICD-10-CM

## 2020-08-25 DIAGNOSIS — I25.118 CORONARY ARTERY DISEASE OF NATIVE ARTERY OF NATIVE HEART WITH STABLE ANGINA PECTORIS (HCC): ICD-10-CM

## 2020-08-25 DIAGNOSIS — I10 ESSENTIAL HYPERTENSION: Primary | ICD-10-CM

## 2020-08-25 PROCEDURE — 99214 OFFICE O/P EST MOD 30 MIN: CPT | Performed by: NURSE PRACTITIONER

## 2020-08-25 RX ORDER — RANOLAZINE 500 MG/1
1000 TABLET, EXTENDED RELEASE ORAL 2 TIMES DAILY
Qty: 180 TABLET | Refills: 3 | Status: SHIPPED | OUTPATIENT
Start: 2020-08-25 | End: 2021-03-19

## 2020-08-25 RX ORDER — ISOSORBIDE MONONITRATE 30 MG/1
30 TABLET, EXTENDED RELEASE ORAL EVERY MORNING
Qty: 90 TABLET | Refills: 3 | Status: SHIPPED | OUTPATIENT
Start: 2020-08-25 | End: 2021-04-19 | Stop reason: SDUPTHER

## 2020-08-25 RX ORDER — PRASUGREL 10 MG/1
10 TABLET, FILM COATED ORAL DAILY
Qty: 90 TABLET | Refills: 3 | Status: SHIPPED | OUTPATIENT
Start: 2020-08-25 | End: 2021-04-19 | Stop reason: SDUPTHER

## 2020-08-25 RX ORDER — ATORVASTATIN CALCIUM 20 MG/1
20 TABLET, FILM COATED ORAL DAILY
Qty: 90 TABLET | Refills: 3 | Status: SHIPPED | OUTPATIENT
Start: 2020-08-25 | End: 2021-04-19 | Stop reason: SDUPTHER

## 2020-08-25 NOTE — PROGRESS NOTES
Chief Complaint   Patient presents with   • Follow-up     For cardiac management. Patient is on aspirin. Last lab work was done about a month ago per PCP, not in chart.    • Med Refill     Needs refills on cardiac medications. 90 day supplies to [facility]. Brought medication list with visit.        Cardiac Complaints  none      Subjective  Sunday Mcclure is a 65 year old female with HTN, hyperlipidemia, DM, and IHD diagnosed in July 2013 when she underwent thrombectomy of the LAD followed by atherectomy and stenting. In 2015, she was admitted to [facility] after syncopal episode.  Workup showed normal EF and disease of the JACQUES. Brain scan did not show acute changes.  She then underwent physical therapy at home and had no residual effects on memory or mobility. Repeat cath on 6/15/17 showing previously placed stent to the proximal to mid LAD was patent but 99% disease of mid to distal LAD noted which was stented x 2.  She also had disease of the RCA with acceptable FFR with medical management. Lexiscan repeated in 2018 as well as 11/2019 for stable angina and CXR apparently showing significant LAD stent stenosis showed similar findings of apical infarct/jaime-infarct ischemia. Plan for cath and atherectomy if CP persisted.    Patient returns today for follow up and reports doing well from a cardiac standpoint. Chest pain, SOA, dizziness, palpitations, and syncope denied. She does however admit to some issue with DM management and states her insulin pump was recently adjusted. Patient has been watching her diet closer and is limiting sugar/starch in regards. Refills are requested for 90 day supply.          Cardiac History  Past Surgical History:   Procedure Laterality Date   • CARDIAC CATHETERIZATION  07/08/2013    EF 45%. 88% LAD- PTCA & Throbectomy. R/O Dissection    • CARDIAC CATHETERIZATION  07/24/2013    ( Dr. Singer) 85% LAD- Atherectomy & 3.5x32 Promus    • CARDIAC CATHETERIZATION   06/15/2017    99% LAD- 2.25x12 & 2.25x28 Promus. 40% RCA- FFR- 0.89   • CARDIOVASCULAR STRESS TEST  08/05/2013     L. Myoview- ([facility]) EF 50% Septal Infarct with jaime-infarct ischemia    • CARDIOVASCULAR STRESS TEST  11/07/2016    EF 65%, apical infarct with jaime-infarct ischemia, continue current   • CARDIOVASCULAR STRESS TEST  08/28/2018    L. Cardiolite- Apical Infarct with PeriInfarct Ischemia.   • CARDIOVASCULAR STRESS TEST  11/27/2019    L. Cardiolite- EF 64%. Apical Infarct with periinfarct Ischemia.   • ECHO - CONVERTED  07/09/2013     ([facility]) EF 35%    • ECHO - CONVERTED  08/05/2013    ([facility]) EF 40%    • ECHO - CONVERTED  08/11/2015    ([facility]) - EF 55-60%    • ECHO - CONVERTED  11/07/2016    EF 60-65%, mild AR, mild MR   • ECHO - CONVERTED  08/28/2018    EF 65%. Mild MR & AI. RVSP- 20 mmHg.   • ECHO - CONVERTED  11/27/2019    EF 65%. Apical WMA. Mild MR & AI.   • US CAROTID UNILATERAL  11/03/2016    Less than 50% bilaterally       Current Outpatient Medications   Medication Sig Dispense Refill   • acyclovir (ZOVIRAX) 400 MG tablet Take 400 mg by mouth Daily. Take no more than 5 doses a day.     • aspirin 81 MG EC tablet Take 1 tablet by mouth Daily. 90 tablet 3   • atorvastatin (LIPITOR) 20 MG tablet Take 1 tablet by mouth Daily. 90 tablet 3   • Calcium Polycarbophil (FIBERTAB PO) Take 3 tablets by mouth Daily.     • ferrous sulfate 324 (65 Fe) MG tablet delayed-release EC tablet Take 324 mg by mouth Daily With Breakfast.     • insulin aspart (novoLOG) 100 UNIT/ML injection Per insulin pump as directed.     • isosorbide mononitrate (IMDUR) 30 MG 24 hr tablet Take 1 tablet by mouth Every Morning. 90 tablet 3   • LamoTRIgine (LAMICTAL XR PO) Take 300 mg by mouth Daily.     • levothyroxine (SYNTHROID) 88 MCG tablet Take 88 mcg by mouth Daily.     • nitroglycerin (NITROSTAT) 0.4 MG SL tablet Place 1 tablet under the tongue Every 5 (Five) Minutes As Needed for Chest Pain. Take no more than 3 doses in 15  minutes. 25 tablet 1   • oxybutynin (DITROPAN) 5 MG tablet Take 5 mg by mouth Daily.     • prasugrel (EFFIENT) 10 MG tablet Take 1 tablet by mouth Daily. 90 tablet 3   • pregabalin (LYRICA) 100 MG capsule Take 200 mg by mouth Every Night.     • pregabalin (LYRICA) 150 MG capsule Take 150 mg by mouth Daily.     • ranolazine (Ranexa) 500 MG 12 hr tablet Take 2 tablets by mouth 2 (Two) Times a Day. 180 tablet 3   • senna (SENEXON) 8.6 MG tablet Take 2 tablets by mouth Every Night.     • senna-docusate (PERICOLACE) 8.6-50 MG per tablet Take 1 tablet by mouth Daily As Needed for Constipation.     • venlafaxine (EFFEXOR) 75 MG tablet Take 75 mg by mouth Daily.     • venlafaxine XR (EFFEXOR-XR) 150 MG 24 hr capsule Take 150 mg by mouth Daily.       No current facility-administered medications for this visit.        Patient has no known allergies.    Past Medical History:   Diagnosis Date   • MARIANA: Moderate to severe sensoral neuropathy 2015   • Anxiety    • Arthritis    • Breast cancer (CMS/HCC)     lumpectomy left breast, radiation   • DDD (degenerative disc disease), lumbar    • Diabetes (CMS/HCC)    • Diabetic neuropathy (CMS/HCC)    • H/O carpal tunnel repair     multiple surgeries   • H/O hand surgery     trigger finger   • H/O thyroidectomy     thyroid cancer   • H/O tubal ligation    • H/O:     • History of knee replacement     Left and right   • Hx of appendectomy    • Hypercholesterolemia    • Hypertension    • MRI head: No acute CVA seen 2015       Social History     Socioeconomic History   • Marital status:      Spouse name: Not on file   • Number of children: Not on file   • Years of education: Not on file   • Highest education level: Not on file   Tobacco Use   • Smoking status: Never Smoker   • Smokeless tobacco: Never Used   Substance and Sexual Activity   • Alcohol use: No   • Drug use: No       Family History   Problem Relation Age of Onset   • No Known Problems Mother    •  "Lymphoma Father    • Myasthenia gravis Sister        Review of Systems   Constitution: Negative for malaise/fatigue and night sweats.   Cardiovascular: Negative for chest pain, claudication, dyspnea on exertion, irregular heartbeat, leg swelling, near-syncope, orthopnea, palpitations and syncope.   Respiratory: Negative for cough, shortness of breath and wheezing.    Musculoskeletal: Positive for stiffness. Negative for back pain, joint pain and joint swelling.   Gastrointestinal: Negative for anorexia, heartburn, melena, nausea and vomiting.   Genitourinary: Negative for dysuria, hematuria, hesitancy and nocturia.   Neurological: Negative for dizziness, light-headedness and loss of balance.   Psychiatric/Behavioral: Negative for depression and memory loss. The patient is not nervous/anxious.            Objective     /64 (BP Location: Right arm)   Pulse 76   Temp 99 °F (37.2 °C)   Ht 154 cm (60.63\")   Wt 61.7 kg (136 lb)   BMI 26.01 kg/m²     Physical Exam   Constitutional: She is oriented to person, place, and time. She appears well-developed and well-nourished.   HENT:   Head: Normocephalic and atraumatic.   Eyes: Pupils are equal, round, and reactive to light. EOM are normal.   Neck: Normal range of motion. Neck supple.   Cardiovascular: Normal rate and regular rhythm.   Murmur heard.  Pulmonary/Chest: Effort normal and breath sounds normal.   Abdominal: Soft.   Musculoskeletal: Normal range of motion.   Neurological: She is alert and oriented to person, place, and time.   Skin: Skin is warm and dry.   Psychiatric: She has a normal mood and affect. Her behavior is normal.       Procedures    Assessment/Plan     IHD/abnormal stress test:  No new concerns voiced. Most recent stress in 2019 did show ischemia but chest pain denied. No cardiac cath will be recommended at this time. She will continue on DAPT  therapy as bleeding and bruising denied. No changes to imdur and ranexa therapy as chest pain " denied.    HTN:  Blood pressure stable. No changes to current recommended.  Limited sodium intake advised.    Hyperlipidemia:  On statin therapy with lipitor. No changes to current dosing recommended. FLP followed by your office.    DM:  Type I DM, on insulin pump. AIC followed by your office, can we have most recent for review?    BMI noted at 26.01, good cardiac ADA diet with limited carbs, calories, and walking regimen without concerns.    Refills per request.    6 month follow up recommended or sooner if needed.      Problems Addressed this Visit        Cardiovascular and Mediastinum    Coronary artery disease of native artery of native heart with stable angina pectoris (CMS/HCC)    Relevant Medications    isosorbide mononitrate (IMDUR) 30 MG 24 hr tablet    prasugrel (EFFIENT) 10 MG tablet    ranolazine (Ranexa) 500 MG 12 hr tablet    HTN (hypertension) - Primary    Hyperlipemia    Relevant Medications    atorvastatin (LIPITOR) 20 MG tablet    Presence of stent in LAD coronary artery    Relevant Medications    isosorbide mononitrate (IMDUR) 30 MG 24 hr tablet    prasugrel (EFFIENT) 10 MG tablet    IHD (ischemic heart disease)    Relevant Medications    isosorbide mononitrate (IMDUR) 30 MG 24 hr tablet    prasugrel (EFFIENT) 10 MG tablet    ranolazine (Ranexa) 500 MG 12 hr tablet      Other Visit Diagnoses     Type 1 diabetes mellitus with other specified complication (CMS/HCC)        Overweight              Patient's Body mass index is 26.01 kg/m². BMI is above normal parameters. Recommendations include: nutrition counseling.              Electronically signed by SARAH Miller August 25, 2020 15:53

## 2020-10-20 ENCOUNTER — TELEPHONE (OUTPATIENT)
Dept: CARDIOLOGY | Facility: CLINIC | Age: 65
End: 2020-10-20

## 2020-10-20 NOTE — TELEPHONE ENCOUNTER
Ines with [facility] called for clarification on if patient is to be taking Imdur and Ranexa. Ines was made aware that patient is to be on both Imdur and Ranexa.

## 2021-01-01 ENCOUNTER — OFFICE VISIT (OUTPATIENT)
Dept: CARDIOLOGY | Facility: CLINIC | Age: 66
End: 2021-01-01

## 2021-01-01 ENCOUNTER — TELEPHONE (OUTPATIENT)
Dept: CARDIOLOGY | Facility: CLINIC | Age: 66
End: 2021-01-01

## 2021-01-01 ENCOUNTER — TELEPHONE (OUTPATIENT)
Dept: ENDOCRINOLOGY | Facility: CLINIC | Age: 66
End: 2021-01-01

## 2021-01-01 VITALS
HEIGHT: 61 IN | HEART RATE: 80 BPM | TEMPERATURE: 97.7 F | WEIGHT: 120 LBS | SYSTOLIC BLOOD PRESSURE: 126 MMHG | DIASTOLIC BLOOD PRESSURE: 68 MMHG | BODY MASS INDEX: 22.66 KG/M2

## 2021-01-01 DIAGNOSIS — I10 PRIMARY HYPERTENSION: Primary | ICD-10-CM

## 2021-01-01 DIAGNOSIS — E78.00 PURE HYPERCHOLESTEROLEMIA: ICD-10-CM

## 2021-01-01 DIAGNOSIS — Z95.5 PRESENCE OF STENT IN LAD CORONARY ARTERY: ICD-10-CM

## 2021-01-01 DIAGNOSIS — I34.0 NON-RHEUMATIC MITRAL REGURGITATION: ICD-10-CM

## 2021-01-01 DIAGNOSIS — E10.42 TYPE 1 DIABETES MELLITUS WITH DIABETIC POLYNEUROPATHY (HCC): ICD-10-CM

## 2021-01-01 DIAGNOSIS — I25.9 IHD (ISCHEMIC HEART DISEASE): ICD-10-CM

## 2021-01-01 DIAGNOSIS — E03.9 ACQUIRED HYPOTHYROIDISM: ICD-10-CM

## 2021-01-01 DIAGNOSIS — I25.118 CORONARY ARTERY DISEASE OF NATIVE ARTERY OF NATIVE HEART WITH STABLE ANGINA PECTORIS (HCC): ICD-10-CM

## 2021-01-01 PROCEDURE — 99214 OFFICE O/P EST MOD 30 MIN: CPT | Performed by: NURSE PRACTITIONER

## 2021-01-01 RX ORDER — ISOSORBIDE MONONITRATE 30 MG/1
30 TABLET, EXTENDED RELEASE ORAL EVERY MORNING
Qty: 90 TABLET | Refills: 3 | Status: SHIPPED | OUTPATIENT
Start: 2021-01-01 | End: 2022-01-01 | Stop reason: SDUPTHER

## 2021-01-01 RX ORDER — PEN NEEDLE, DIABETIC 32GX 5/32"
NEEDLE, DISPOSABLE MISCELLANEOUS
Qty: 30 EACH | Refills: 0 | Status: SHIPPED | OUTPATIENT
Start: 2021-01-01 | End: 2022-01-01 | Stop reason: SDUPTHER

## 2021-01-01 RX ORDER — RANOLAZINE 500 MG/1
500 TABLET, EXTENDED RELEASE ORAL 2 TIMES DAILY
Qty: 180 TABLET | Refills: 3 | Status: SHIPPED | OUTPATIENT
Start: 2021-01-01 | End: 2022-01-01

## 2021-01-01 RX ORDER — LEVOTHYROXINE SODIUM 100 MCG
TABLET ORAL
Qty: 30 TABLET | Refills: 5 | Status: SHIPPED | OUTPATIENT
Start: 2021-01-01 | End: 2022-01-01

## 2021-01-01 RX ORDER — PERPHENAZINE 16 MG/1
TABLET, FILM COATED ORAL
Qty: 200 EACH | Refills: 1 | Status: SHIPPED | OUTPATIENT
Start: 2021-01-01 | End: 2022-01-01 | Stop reason: SDUPTHER

## 2021-01-01 RX ORDER — ATORVASTATIN CALCIUM 20 MG/1
20 TABLET, FILM COATED ORAL DAILY
Qty: 90 TABLET | Refills: 3 | Status: SHIPPED | OUTPATIENT
Start: 2021-01-01

## 2021-01-01 RX ORDER — PRASUGREL 10 MG/1
10 TABLET, FILM COATED ORAL DAILY
Qty: 90 TABLET | Refills: 3 | Status: SHIPPED | OUTPATIENT
Start: 2021-01-01 | End: 2022-01-01 | Stop reason: SDUPTHER

## 2021-01-01 RX ORDER — INSULIN DEGLUDEC INJECTION 100 U/ML
8 INJECTION, SOLUTION SUBCUTANEOUS NIGHTLY
Qty: 3 ML | Refills: 0 | Status: SHIPPED | OUTPATIENT
Start: 2021-01-01 | End: 2022-01-01 | Stop reason: SDUPTHER

## 2021-02-26 ENCOUNTER — IMMUNIZATION (OUTPATIENT)
Dept: VACCINE CLINIC | Facility: HOSPITAL | Age: 66
End: 2021-02-26

## 2021-02-26 PROCEDURE — 0001A: CPT | Performed by: INTERNAL MEDICINE

## 2021-02-26 PROCEDURE — 91300 HC SARSCOV02 VAC 30MCG/0.3ML IM: CPT | Performed by: INTERNAL MEDICINE

## 2021-03-03 ENCOUNTER — OFFICE VISIT (OUTPATIENT)
Dept: ENDOCRINOLOGY | Facility: CLINIC | Age: 66
End: 2021-03-03

## 2021-03-03 VITALS
BODY MASS INDEX: 23.83 KG/M2 | RESPIRATION RATE: 18 BRPM | HEIGHT: 61 IN | HEART RATE: 73 BPM | TEMPERATURE: 98 F | OXYGEN SATURATION: 94 % | DIASTOLIC BLOOD PRESSURE: 78 MMHG | WEIGHT: 126.2 LBS | SYSTOLIC BLOOD PRESSURE: 116 MMHG

## 2021-03-03 DIAGNOSIS — E10.42 TYPE 1 DIABETES MELLITUS WITH DIABETIC POLYNEUROPATHY (HCC): ICD-10-CM

## 2021-03-03 DIAGNOSIS — E10.649 HYPOGLYCEMIA UNAWARENESS ASSOCIATED WITH TYPE 1 DIABETES MELLITUS (HCC): Primary | ICD-10-CM

## 2021-03-03 PROBLEM — Z79.4 TYPE 2 DIABETES MELLITUS, WITH LONG-TERM CURRENT USE OF INSULIN (HCC): Status: RESOLVED | Noted: 2019-02-22 | Resolved: 2021-03-03

## 2021-03-03 PROBLEM — E11.9 TYPE 2 DIABETES MELLITUS, WITH LONG-TERM CURRENT USE OF INSULIN (HCC): Status: RESOLVED | Noted: 2019-02-22 | Resolved: 2021-03-03

## 2021-03-03 PROCEDURE — 99204 OFFICE O/P NEW MOD 45 MIN: CPT | Performed by: INTERNAL MEDICINE

## 2021-03-03 PROCEDURE — 95251 CONT GLUC MNTR ANALYSIS I&R: CPT | Performed by: INTERNAL MEDICINE

## 2021-03-03 NOTE — PROGRESS NOTES
"     Office Note      Date: 2021  Patient Name: Sunday Mcclure  MRN: [id number]  : 1955    Chief Complaint   Patient presents with   • Establish Care   • Diabetes   • Hypothyroidism       History of Present Illness:   Sunday Mcclure is a 66 y.o. female who presents for Diabetes - TYPE 1.  PT IS SEEN AS A NEW PT TO ME TODAY  WE HAVE CALLED FOR HER LABS.  SHE HAD BEEN A PT OF MY OLD PRACTICE FOR YEARS BUT I LAST SAW HER  IN 2016.  SHE HAS TYPE 1 DIABETES SINCE AGE 18  TREATED WITH INSULIN IN A MEDTRONIC PUMP  SHE CHECKS  TIMES PER DAY WITH A DEXCOM AND ADJUSTS HER INSULIN BASED UPON THOSE #/S.  SHE USED TO HAVE TERRIBLE HYPOS UNTIL SHE GOT THE DEXCOM  SHE HAS RETINOPATHY AND NEUROPATHY  SHE HAS HYPOTHRYODIISM BUT HAS BEEN EUTHYROID ON HER CURRENT MEDS   Hemoglobin A1C   Date Value Ref Range Status   2015 8.0 (H) 4.00 - 6.00 % Final     Comment:     DF by IF @ 2015 05:57  The American Diabetes Association recommends maintenance of Hemoglobin A1C at 7.0% or lower.  Goals for Hemoglobin A1C reduction may need to be modified if hypoglycemia is a problem.         Subjective       Review of Systems:   Review of Systems   Constitutional: Positive for fatigue.   Psychiatric/Behavioral: Positive for confusion.   All other systems reviewed and are negative.      The following portions of the patient's history were reviewed and updated as appropriate: allergies, current medications, past family history, past medical history, past social history, past surgical history and problem list.    Objective     Visit Vitals  /78   Pulse 73   Temp 98 °F (36.7 °C)   Resp 18   Ht 154 cm (60.63\")   Wt 57.2 kg (126 lb 3.2 oz)   SpO2 94%   BMI 24.14 kg/m²       Labs:    CMP  Lab Results   Component Value Date    GLUCOSE 321 (H) 2015    BUN 19 2015    CREATININE 0.9 2015    K 4.1 2015    CO2 26 2015    CALCIUM 8.5 (L) 2015    AST 19 2015    ALT 12 2015    "     CBC w/DIFF  Lab Results   Component Value Date    WBC 5.18 08/12/2015    RBC 4.04 08/12/2015    HGB 10.8 (L) 08/12/2015    HCT 34.0 (L) 08/12/2015    MCV 84.2 08/12/2015    MCH 26.7 (L) 08/12/2015    MCHC 31.8 (L) 08/12/2015     08/12/2015    NEUTRORELPCT 48.1 08/10/2015    LYMPHORELPCT 32.7 08/10/2015    MONORELPCT 10.7 08/10/2015    EOSRELPCT 7.5 (H) 08/10/2015    BASORELPCT 0.8 08/10/2015    NEUTROABS 2.42 08/10/2015    LYMPHSABS 1.65 08/10/2015    MONOSABS 0.54 08/10/2015    EOSABS 0.38 (H) 08/10/2015    BASOSABS 0.04 08/10/2015       Physical Exam:  Physical Exam  Vitals signs reviewed.   Constitutional:       General: She is not in acute distress.     Appearance: She is not ill-appearing, toxic-appearing or diaphoretic.   HENT:      Head: Normocephalic and atraumatic.   Eyes:      Extraocular Movements: Extraocular movements intact.   Neck:      Musculoskeletal: Normal range of motion.   Cardiovascular:      Rate and Rhythm: Normal rate and regular rhythm.      Pulses:           Dorsalis pedis pulses are 2+ on the right side and 2+ on the left side.        Posterior tibial pulses are 2+ on the right side and 2+ on the left side.   Pulmonary:      Effort: Pulmonary effort is normal. No respiratory distress.   Musculoskeletal: Normal range of motion.         General: No swelling.      Right foot: Normal range of motion. No deformity, bunion, Charcot foot, foot drop or prominent metatarsal heads.      Left foot: Normal range of motion. No deformity, bunion, Charcot foot, foot drop or prominent metatarsal heads.   Feet:      Right foot:      Protective Sensation: 10 sites tested. 6 sites sensed.      Skin integrity: Skin integrity normal.      Toenail Condition: Right toenails are normal.      Left foot:      Protective Sensation: 10 sites tested. 6 sites sensed.      Skin integrity: Skin integrity normal.      Toenail Condition: Left toenails are normal.      Comments: Diabetic Foot Exam Performed and  Monofilament Test Performed  Lymphadenopathy:      Cervical: No cervical adenopathy.   Skin:     General: Skin is warm.      Coloration: Skin is not jaundiced.   Neurological:      Mental Status: She is alert. She is disoriented.   Psychiatric:         Mood and Affect: Mood normal.         Behavior: Behavior normal.         Assessment / Plan      Assessment & Plan:  Problem List Items Addressed This Visit        Other    Type 1 diabetes mellitus with diabetic polyneuropathy (CMS/[facility])    Current Assessment & Plan      AN A1C OF 8 WITH MINIMAL CURRENT HYPOGLYCEMIA IS ACTUALLY QUITE GOOD FOR HER.    HER DEXCOM DOWNLOAD SHOWS NO HYPOGLYCEMIA IN THE LAST 2 WEEKS WITH ONLY 11 % ABOVE 250 . 26 % IN RANGE.   ESTIMATED A1C OF 8.0    THIS IS QUITE GOOD.    SHE WILL DO WELL WITH THE TANDEM PUMP WHEN SHE CAN GET ONE TO GO WITH HER DEXCOM THIS COMING SUMMER .         Relevant Medications    insulin aspart (novoLOG) 100 UNIT/ML injection    Hypoglycemia unawareness associated with type 1 diabetes mellitus (CMS/[facility]) - Primary    Current Assessment & Plan      THE HYPOGLYCEMIA IS WELL CONTROLLED WITH THE DEXCOM SINCE IT ALERTS HER TO IMPENDING LOWS. UNFORTUNATELY SHE HAS HAD SO MANY LOWS OVER THE 48 YEARS OF TYPE 1 DIABETES THAT SHE HAS A BIT OF DEMENTIA FROM IT.           Relevant Medications    insulin aspart (novoLOG) 100 UNIT/ML injection           Lester Valles MD   03/03/2021

## 2021-03-16 DIAGNOSIS — I25.9 IHD (ISCHEMIC HEART DISEASE): ICD-10-CM

## 2021-03-16 DIAGNOSIS — Z95.5 PRESENCE OF STENT IN LAD CORONARY ARTERY: ICD-10-CM

## 2021-03-18 DIAGNOSIS — I25.118 CORONARY ARTERY DISEASE OF NATIVE ARTERY OF NATIVE HEART WITH STABLE ANGINA PECTORIS (HCC): ICD-10-CM

## 2021-03-18 DIAGNOSIS — I25.9 IHD (ISCHEMIC HEART DISEASE): ICD-10-CM

## 2021-03-18 DIAGNOSIS — Z95.5 PRESENCE OF STENT IN LAD CORONARY ARTERY: ICD-10-CM

## 2021-03-18 RX ORDER — ASPIRIN 81 MG/1
81 TABLET ORAL DAILY
Qty: 90 TABLET | Refills: 3 | Status: SHIPPED | OUTPATIENT
Start: 2021-03-18 | End: 2022-01-01

## 2021-03-19 RX ORDER — ASPIRIN 81 MG/1
TABLET, FILM COATED ORAL
Qty: 30 TABLET | Refills: 3 | OUTPATIENT
Start: 2021-03-19

## 2021-03-19 RX ORDER — RANOLAZINE 500 MG/1
1000 TABLET, EXTENDED RELEASE ORAL 2 TIMES DAILY
Qty: 120 TABLET | Refills: 0 | Status: SHIPPED | OUTPATIENT
Start: 2021-03-19 | End: 2021-04-19 | Stop reason: SDUPTHER

## 2021-03-22 ENCOUNTER — IMMUNIZATION (OUTPATIENT)
Dept: VACCINE CLINIC | Facility: HOSPITAL | Age: 66
End: 2021-03-22

## 2021-03-22 PROCEDURE — 91300 HC SARSCOV02 VAC 30MCG/0.3ML IM: CPT | Performed by: INTERNAL MEDICINE

## 2021-03-22 PROCEDURE — 0002A: CPT | Performed by: INTERNAL MEDICINE

## 2021-04-19 ENCOUNTER — OFFICE VISIT (OUTPATIENT)
Dept: CARDIOLOGY | Facility: CLINIC | Age: 66
End: 2021-04-19

## 2021-04-19 VITALS
SYSTOLIC BLOOD PRESSURE: 126 MMHG | WEIGHT: 117 LBS | BODY MASS INDEX: 22.09 KG/M2 | HEIGHT: 61 IN | DIASTOLIC BLOOD PRESSURE: 62 MMHG | TEMPERATURE: 98.2 F | HEART RATE: 68 BPM

## 2021-04-19 DIAGNOSIS — E03.9 ACQUIRED HYPOTHYROIDISM: ICD-10-CM

## 2021-04-19 DIAGNOSIS — I25.9 IHD (ISCHEMIC HEART DISEASE): ICD-10-CM

## 2021-04-19 DIAGNOSIS — Z95.5 PRESENCE OF STENT IN LAD CORONARY ARTERY: ICD-10-CM

## 2021-04-19 DIAGNOSIS — E10.42 TYPE 1 DIABETES MELLITUS WITH DIABETIC POLYNEUROPATHY (HCC): Primary | ICD-10-CM

## 2021-04-19 DIAGNOSIS — I25.118 CORONARY ARTERY DISEASE OF NATIVE ARTERY OF NATIVE HEART WITH STABLE ANGINA PECTORIS (HCC): ICD-10-CM

## 2021-04-19 DIAGNOSIS — I10 ESSENTIAL HYPERTENSION: ICD-10-CM

## 2021-04-19 DIAGNOSIS — E78.00 PURE HYPERCHOLESTEROLEMIA: ICD-10-CM

## 2021-04-19 PROCEDURE — 93000 ELECTROCARDIOGRAM COMPLETE: CPT | Performed by: NURSE PRACTITIONER

## 2021-04-19 PROCEDURE — 99214 OFFICE O/P EST MOD 30 MIN: CPT | Performed by: NURSE PRACTITIONER

## 2021-04-19 RX ORDER — PRASUGREL 10 MG/1
10 TABLET, FILM COATED ORAL DAILY
Qty: 90 TABLET | Refills: 3 | Status: SHIPPED | OUTPATIENT
Start: 2021-04-19 | End: 2021-01-01 | Stop reason: SDUPTHER

## 2021-04-19 RX ORDER — RANOLAZINE 500 MG/1
1000 TABLET, EXTENDED RELEASE ORAL 2 TIMES DAILY
Qty: 120 TABLET | Refills: 8 | Status: SHIPPED | OUTPATIENT
Start: 2021-04-19 | End: 2021-01-01 | Stop reason: SDUPTHER

## 2021-04-19 RX ORDER — ATORVASTATIN CALCIUM 20 MG/1
20 TABLET, FILM COATED ORAL DAILY
Qty: 90 TABLET | Refills: 3 | Status: SHIPPED | OUTPATIENT
Start: 2021-04-19 | End: 2021-01-01 | Stop reason: SDUPTHER

## 2021-04-19 RX ORDER — ISOSORBIDE MONONITRATE 30 MG/1
30 TABLET, EXTENDED RELEASE ORAL EVERY MORNING
Qty: 90 TABLET | Refills: 3 | Status: SHIPPED | OUTPATIENT
Start: 2021-04-19 | End: 2021-01-01 | Stop reason: SDUPTHER

## 2021-04-19 NOTE — PROGRESS NOTES
Chief Complaint   Patient presents with   • Follow-up     cardiac management. updated ekg on file. pt denies any cardiac complaints.   • Med Refill     will need cardiac meds [facility]. discussed meds verbally.   • Labs     pap monitors labs. had drawn 2 months ago. not in chart. states she sees her again next month.   • Aspirin     is on daily ASA       Cardiac Complaints  none      Subjective   Sunday Mcclure is a 66 y.o. female with HTN, hyperlipidemia, DM, and IHD diagnosed in July 2013 when she underwent thrombectomy of the LAD followed by atherectomy and stenting. In 2015, she was admitted to [facility] after syncopal episode.  Workup showed normal EF and disease of the JACQUES. Brain scan did not show acute changes.  She then underwent physical therapy at home and had no residual effects on memory or mobility. Repeat cath on 6/15/17 showing previously placed stent to the proximal to mid LAD was patent but 99% disease of mid to distal LAD noted which was stented x 2.  She also had disease of the RCA with acceptable FFR with medical management. Lexiscan repeated in 2018 as well as 11/2019 for stable angina and CXR apparently showing significant LAD stent stenosis showed similar findings of apical infarct/jaime-infarct ischemia. Plan for cath and atherectomy if CP persisted.    Patient returns today for follow up and reports doing well from a cardiac standpoint. Chest pain, SOA, dizziness, palpitations, and syncope denied. She remains followed by endocrine for DM management. Patient has been watching her diet closer and is limiting sugar/starch in regards.  Labs remain followed by PCP and were checked 2 months ago, no current available. Refills are requested for 90 day supply.    Cardiac History  Past Surgical History:   Procedure Laterality Date   • CARDIAC CATHETERIZATION  07/08/2013    EF 45%. 88% LAD- PTCA & Throbectomy. R/O Dissection    • CARDIAC CATHETERIZATION  07/24/2013     ( Dr. Singer) 85% LAD- Atherectomy & 3.5x32 Promus    • CARDIAC CATHETERIZATION  06/15/2017    99% LAD- 2.25x12 & 2.25x28 Promus. 40% RCA- FFR- 0.89   • CARDIOVASCULAR STRESS TEST  08/05/2013     L. Myoview- ([facility]) EF 50% Septal Infarct with jaime-infarct ischemia    • CARDIOVASCULAR STRESS TEST  11/07/2016    EF 65%, apical infarct with jaime-infarct ischemia, continue current   • CARDIOVASCULAR STRESS TEST  08/28/2018    L. Cardiolite- Apical Infarct with PeriInfarct Ischemia.   • CARDIOVASCULAR STRESS TEST  11/27/2019    L. Cardiolite- EF 64%. Apical Infarct with periinfarct Ischemia.   • ECHO - CONVERTED  07/09/2013     ([facility]) EF 35%    • ECHO - CONVERTED  08/05/2013    ([facility]) EF 40%    • ECHO - CONVERTED  08/11/2015    ([facility]) - EF 55-60%    • ECHO - CONVERTED  11/07/2016    EF 60-65%, mild AR, mild MR   • ECHO - CONVERTED  08/28/2018    EF 65%. Mild MR & AI. RVSP- 20 mmHg.   • ECHO - CONVERTED  11/27/2019    EF 65%. Apical WMA. Mild MR & AI.   • KNEE SURGERY     • US CAROTID UNILATERAL  11/03/2016    Less than 50% bilaterally       Current Outpatient Medications   Medication Sig Dispense Refill   • acyclovir (ZOVIRAX) 400 MG tablet Take 400 mg by mouth Daily. Take no more than 5 doses a day.     • aspirin 81 MG EC tablet Take 1 tablet by mouth Daily. 90 tablet 3   • atorvastatin (LIPITOR) 20 MG tablet Take 1 tablet by mouth Daily. 90 tablet 3   • Calcium Polycarbophil (FIBERTAB PO) Take 3 tablets by mouth Daily.     • ferrous sulfate 324 (65 Fe) MG tablet delayed-release EC tablet Take 324 mg by mouth Daily With Breakfast.     • insulin aspart (novoLOG) 100 UNIT/ML injection Per insulin pump as directed.     • isosorbide mononitrate (IMDUR) 30 MG 24 hr tablet Take 1 tablet by mouth Every Morning. 90 tablet 3   • LamoTRIgine (LAMICTAL XR PO) Take 300 mg by mouth Daily.     • levothyroxine (SYNTHROID) 88 MCG tablet Take 88 mcg by mouth Daily.     • nitroglycerin (NITROSTAT) 0.4 MG SL tablet Place 1 tablet  under the tongue Every 5 (Five) Minutes As Needed for Chest Pain. Take no more than 3 doses in 15 minutes. 25 tablet 1   • oxybutynin (DITROPAN) 5 MG tablet Take 5 mg by mouth Daily.     • prasugrel (EFFIENT) 10 MG tablet Take 1 tablet by mouth Daily. 90 tablet 3   • pregabalin (LYRICA) 150 MG capsule Take 150 mg by mouth 2 (Two) Times a Day.     • ranolazine (RANEXA) 500 MG 12 hr tablet Take 2 tablets by mouth 2 (Two) Times a Day. 120 tablet 8   • senna (SENEXON) 8.6 MG tablet Take 2 tablets by mouth Every Night.     • senna-docusate (PERICOLACE) 8.6-50 MG per tablet Take 1 tablet by mouth Daily As Needed for Constipation.     • venlafaxine (EFFEXOR) 75 MG tablet Take 75 mg by mouth Daily.     • venlafaxine XR (EFFEXOR-XR) 150 MG 24 hr capsule Take 150 mg by mouth Daily.       No current facility-administered medications for this visit.       Patient has no known allergies.    Past Medical History:   Diagnosis Date   • MARIANA: Moderate to severe sensoral neuropathy 2015   • Anxiety    • Arthritis    • Breast cancer (CMS/HCC)     lumpectomy left breast, radiation   • DDD (degenerative disc disease), lumbar    • Diabetes (CMS/HCC)    • Diabetic neuropathy (CMS/HCC)    • H/O carpal tunnel repair     multiple surgeries   • H/O hand surgery     trigger finger   • H/O thyroidectomy     thyroid cancer   • H/O tubal ligation    • H/O:     • History of knee replacement     Left and right   • Hx of appendectomy    • Hypercholesterolemia    • Hypertension    • MRI head: No acute CVA seen 2015       Social History     Socioeconomic History   • Marital status:      Spouse name: Not on file   • Number of children: Not on file   • Years of education: Not on file   • Highest education level: Not on file   Tobacco Use   • Smoking status: Never Smoker   • Smokeless tobacco: Never Used   Vaping Use   • Vaping Use: Never used   Substance and Sexual Activity   • Alcohol use: No   • Drug use: No       Family  "History   Problem Relation Age of Onset   • No Known Problems Mother    • Lymphoma Father    • Myasthenia gravis Sister        Review of Systems   Constitutional: Negative for malaise/fatigue and night sweats.   Cardiovascular: Negative for chest pain, claudication, dyspnea on exertion, irregular heartbeat, leg swelling, near-syncope, orthopnea, palpitations and syncope.   Respiratory: Negative for cough, shortness of breath and wheezing.    Musculoskeletal: Negative for back pain, joint pain, joint swelling and stiffness.   Gastrointestinal: Negative for anorexia, heartburn, melena, nausea and vomiting.   Genitourinary: Negative for dysuria, hematuria, hesitancy and nocturia.   Neurological: Negative for dizziness, light-headedness and loss of balance.   Psychiatric/Behavioral: Negative for depression and memory loss. The patient is not nervous/anxious.            Objective     /62 (BP Location: Left arm)   Pulse 68   Temp 98.2 °F (36.8 °C)   Ht 154 cm (60.63\")   Wt 53.1 kg (117 lb)   BMI 22.38 kg/m²     Constitutional:       Appearance: Healthy appearance. Not in distress.   Eyes:      Pupils: Pupils are equal, round, and reactive to light.   HENT:      Nose: Nose normal.   Pulmonary:      Effort: Pulmonary effort is normal.      Breath sounds: Normal breath sounds.   Cardiovascular:      PMI at left midclavicular line. Normal rate. Regular rhythm.      Murmurs: There is a systolic murmur.   Abdominal:      Palpations: Abdomen is soft.   Musculoskeletal: Normal range of motion.      Cervical back: Normal range of motion and neck supple. Skin:     General: Skin is warm and dry.   Neurological:      Mental Status: Oriented to person, place and time.           ECG 12 Lead    Date/Time: 4/19/2021 1:54 PM  Performed by: Margi Albright APRN  Authorized by: Margi Albright APRN   Comparison: compared with previous ECG from 7/10/2013  Rhythm: sinus rhythm  BPM: 68  Conduction comments: Right axis " deviation    Clinical impression: non-specific ECG  Comments: Normal QT            Assessment/Plan     IHD/abnormal stress test:  No new concerns voiced. Most recent stress in 2019 did show ischemia but chest pain denied. No cardiac cath will be recommended at this time. She will continue on DAPT therapy as bleeding and bruising denied. No changes to imdur and ranexa therapy as chest pain denied.  Ranexa to sent as branded.  We will discuss repeat workup to be considered at next visit, she was urged to call with concerns.  EKG done today shows NSR with normal QT with no acute ST changes, right axis deviation noted.      HTN:  Blood pressure stable. No changes to current recommended.  Limited sodium intake advised.     Hyperlipidemia:  On statin therapy with lipitor. No changes to current dosing recommended. FLP followed by your office.     DM:  Type I DM, on insulin pump. AIC followed by your office, can we have most recent for review?     BMI noted at 22.38, good cardiac ADA diet with limited carb intake advised. She states she is doing much better as she has been limiting sugar/carb as she is now only eating 20 carbs daily.  She is walking at home without concerns.     Refills per request.     6 month follow up recommended or sooner if needed.        Problems Addressed this Visit        Cardiac and Vasculature    Coronary artery disease of native artery of native heart with stable angina pectoris (CMS/HCC)    Relevant Medications    ranolazine (RANEXA) 500 MG 12 hr tablet    isosorbide mononitrate (IMDUR) 30 MG 24 hr tablet    prasugrel (EFFIENT) 10 MG tablet    HTN (hypertension)    Hyperlipemia    Relevant Medications    atorvastatin (LIPITOR) 20 MG tablet    Presence of stent in LAD coronary artery    Relevant Medications    isosorbide mononitrate (IMDUR) 30 MG 24 hr tablet    prasugrel (EFFIENT) 10 MG tablet    IHD (ischemic heart disease)    Relevant Medications    ranolazine (RANEXA) 500 MG 12 hr tablet     isosorbide mononitrate (IMDUR) 30 MG 24 hr tablet    prasugrel (EFFIENT) 10 MG tablet    Other Relevant Orders    ECG 12 Lead       Endocrine and Metabolic    Hypothyroid    Type 1 diabetes mellitus with diabetic polyneuropathy (CMS/HCC) - Primary      Diagnoses       Codes Comments    Type 1 diabetes mellitus with diabetic polyneuropathy (CMS/HCC)    -  Primary ICD-10-CM: E10.42  ICD-9-CM: 250.61, 357.2     IHD (ischemic heart disease)     ICD-10-CM: I25.9  ICD-9-CM: 414.9     Coronary artery disease of native artery of native heart with stable angina pectoris (CMS/[facility])     ICD-10-CM: I25.118  ICD-9-CM: 414.01, 413.9     Essential hypertension     ICD-10-CM: I10  ICD-9-CM: 401.9     Presence of stent in LAD coronary artery     ICD-10-CM: Z95.5  ICD-9-CM: V45.82     Pure hypercholesterolemia     ICD-10-CM: E78.00  ICD-9-CM: 272.0     Acquired hypothyroidism     ICD-10-CM: E03.9  ICD-9-CM: 244.9           Patient's Body mass index is 22.38 kg/m². BMI is within normal parameters. No follow-up required..            Electronically signed by SARAH Miller April 19, 2021 16:29 EDT

## 2021-05-20 ENCOUNTER — PRIOR AUTHORIZATION (OUTPATIENT)
Dept: CARDIOLOGY | Facility: CLINIC | Age: 66
End: 2021-05-20

## 2021-05-20 NOTE — TELEPHONE ENCOUNTER
Prior authorization was done for ranexa 500 mg 2 tablets BID through [facility]. It was approved from 02/19/21 to 05/20/22.

## 2021-05-27 ENCOUNTER — TELEPHONE (OUTPATIENT)
Dept: CARDIOLOGY | Facility: CLINIC | Age: 66
End: 2021-05-27

## 2021-06-02 ENCOUNTER — TELEPHONE (OUTPATIENT)
Dept: CARDIOLOGY | Facility: CLINIC | Age: 66
End: 2021-06-02

## 2021-06-02 NOTE — TELEPHONE ENCOUNTER
Patient states that she saw PCP for dizzy spells. She states that BP has been running 90/54, but she states that is normal for her. Patient states PCP wanted her to ask if Ranexa 500 mg 2 tablets BID could be causing dizziness and if so should it be lowered to 500 mg 1 tablet BID?    She states that she goes to see endocrinologist tomorrow (06/03/21).

## 2021-06-03 ENCOUNTER — LAB (OUTPATIENT)
Dept: LAB | Facility: HOSPITAL | Age: 66
End: 2021-06-03

## 2021-06-03 ENCOUNTER — OFFICE VISIT (OUTPATIENT)
Dept: ENDOCRINOLOGY | Facility: CLINIC | Age: 66
End: 2021-06-03

## 2021-06-03 VITALS
HEIGHT: 61 IN | HEART RATE: 75 BPM | BODY MASS INDEX: 21.3 KG/M2 | OXYGEN SATURATION: 99 % | DIASTOLIC BLOOD PRESSURE: 50 MMHG | SYSTOLIC BLOOD PRESSURE: 104 MMHG | WEIGHT: 112.8 LBS

## 2021-06-03 DIAGNOSIS — E89.0 POSTOPERATIVE HYPOTHYROIDISM: ICD-10-CM

## 2021-06-03 DIAGNOSIS — E10.649 HYPOGLYCEMIA UNAWARENESS ASSOCIATED WITH TYPE 1 DIABETES MELLITUS (HCC): ICD-10-CM

## 2021-06-03 DIAGNOSIS — E10.42 TYPE 1 DIABETES MELLITUS WITH DIABETIC POLYNEUROPATHY (HCC): ICD-10-CM

## 2021-06-03 DIAGNOSIS — E10.42 TYPE 1 DIABETES MELLITUS WITH DIABETIC POLYNEUROPATHY (HCC): Primary | ICD-10-CM

## 2021-06-03 LAB
EXPIRATION DATE: ABNORMAL
EXPIRATION DATE: NORMAL
GLUCOSE BLDC GLUCOMTR-MCNC: 206 MG/DL (ref 70–130)
HBA1C MFR BLD: 7.3 %
Lab: ABNORMAL
Lab: NORMAL
TSH SERPL DL<=0.05 MIU/L-ACNC: 13.9 UIU/ML (ref 0.27–4.2)

## 2021-06-03 PROCEDURE — 83036 HEMOGLOBIN GLYCOSYLATED A1C: CPT | Performed by: INTERNAL MEDICINE

## 2021-06-03 PROCEDURE — 99214 OFFICE O/P EST MOD 30 MIN: CPT | Performed by: INTERNAL MEDICINE

## 2021-06-03 PROCEDURE — 84443 ASSAY THYROID STIM HORMONE: CPT

## 2021-06-03 PROCEDURE — 95251 CONT GLUC MNTR ANALYSIS I&R: CPT | Performed by: INTERNAL MEDICINE

## 2021-06-03 RX ORDER — PREGABALIN 100 MG/1
100 CAPSULE ORAL
COMMUNITY
Start: 2021-05-19 | End: 2021-01-01 | Stop reason: ALTCHOICE

## 2021-06-03 RX ORDER — OXYBUTYNIN CHLORIDE 5 MG/1
5 TABLET, EXTENDED RELEASE ORAL DAILY
COMMUNITY
Start: 2021-05-19

## 2021-06-03 RX ORDER — CALCIUM POLYCARBOPHIL 625 MG/1
625 TABLET, FILM COATED ORAL 3 TIMES DAILY
COMMUNITY
Start: 2021-05-19

## 2021-06-03 NOTE — PROGRESS NOTES
"     Office Note      Date: 2021  Patient Name: Sunday Mcclure  MRN: [id number]  : 1955    Chief Complaint   Patient presents with   • Diabetes       History of Present Illness:   Sunday Mcclure is a 66 y.o. female who presents for Diabetes -type 1  On insulin in a medtronic pump.  She will be getting a tandem pump in July she says  She uses dexcom to check her bg 288 times per day.  She adjusts her insluin doses based upon her readings     Download shows 51% time in range 39 % high and 9 % very high.  No hypos in last 2 weeks.  2 weeks of data were reviewed.        Hemoglobin A1C   Date Value Ref Range Status   2021 7.3 % Final       Changes in health since last visit: none . Last eye exam up to date  She notes no changes to her skin.  She has lost weight and is feeling well .    Subjective          Review of Systems:   Review of Systems   Constitutional: Negative.    HENT: Negative.    Eyes: Negative.    Respiratory: Negative.    All other systems reviewed and are negative.      The following portions of the patient's history were reviewed and updated as appropriate: allergies, current medications, past family history, past medical history, past social history, past surgical history and problem list.    Objective     Visit Vitals  /50   Pulse 75   Ht 154.9 cm (61\")   Wt 51.2 kg (112 lb 12.8 oz)   SpO2 99%   BMI 21.31 kg/m²       Labs:    CMP  Lab Results   Component Value Date    GLUCOSE 321 (H) 2015    BUN 19 2015    CREATININE 0.9 2015    K 4.1 2015    CO2 26 2015    CALCIUM 8.5 (L) 2015    AST 19 2015    ALT 12 2015        CBC w/DIFF  Lab Results   Component Value Date    WBC 5.18 2015    RBC 4.04 2015    HGB 10.8 (L) 2015    HCT 34.0 (L) 2015    MCV 84.2 2015    MCH 26.7 (L) 2015    MCHC 31.8 (L) 2015     2015    NEUTRORELPCT 48.1 08/10/2015    LYMPHORELPCT 32.7 08/10/2015    " MONORELPCT 10.7 08/10/2015    EOSRELPCT 7.5 (H) 08/10/2015    BASORELPCT 0.8 08/10/2015    NEUTROABS 2.42 08/10/2015    LYMPHSABS 1.65 08/10/2015    MONOSABS 0.54 08/10/2015    EOSABS 0.38 (H) 08/10/2015    BASOSABS 0.04 08/10/2015       Physical Exam:  Physical Exam  Vitals reviewed.   Constitutional:       Appearance: Normal appearance.   Neurological:      Mental Status: She is alert.   Psychiatric:         Thought Content: Thought content normal.         Judgment: Judgment normal.          Assessment / Plan      Assessment & Plan:  Problem List Items Addressed This Visit        Other    Hypothyroid    Overview     Surgery in 1990 for cancer- now cured          Current Assessment & Plan     Will check tsh today and adjust dose as indicated by results          Relevant Medications    levothyroxine (SYNTHROID) 88 MCG tablet    Type 1 diabetes mellitus with diabetic polyneuropathy (CMS/HCC) - Primary    Current Assessment & Plan      Improved.  Fewer hypos/ more time in range/  Great report todayd          Relevant Medications    insulin aspart (novoLOG) 100 UNIT/ML injection    Other Relevant Orders    POC Glycosylated Hemoglobin (Hb A1C) (Completed)    POC Glucose, Blood (Completed)    Hypoglycemia unawareness associated with type 1 diabetes mellitus (CMS/HCC)    Relevant Medications    insulin aspart (novoLOG) 100 UNIT/ML injection           Lester Valles MD   06/03/2021

## 2021-06-04 RX ORDER — LEVOTHYROXINE SODIUM 0.1 MG/1
100 TABLET ORAL DAILY
Qty: 30 TABLET | Refills: 5 | Status: SHIPPED | OUTPATIENT
Start: 2021-06-04 | End: 2021-01-01

## 2021-06-04 NOTE — TELEPHONE ENCOUNTER
----- Message from Lester Valles MD sent at 6/4/2021  1:31 PM EDT -----  Please let her know we need to incrase her levothyroxine from 88 to 100 mcg per day. Once you reeach her, let me know and I will send in the script

## 2021-06-30 ENCOUNTER — TELEPHONE (OUTPATIENT)
Dept: ENDOCRINOLOGY | Facility: CLINIC | Age: 66
End: 2021-06-30

## 2021-06-30 NOTE — TELEPHONE ENCOUNTER
PATIENT CALLED TODAY STATING THAT SHE HAS RECEIVED T-SLIM. SHE STATES THAT DR POON WANTED TO KNOW ONCE SHE RECEIVED THIS ITEM.    CALL BACK [phone]

## 2021-07-12 ENCOUNTER — TELEPHONE (OUTPATIENT)
Dept: ENDOCRINOLOGY | Facility: CLINIC | Age: 66
End: 2021-07-12

## 2021-08-18 ENCOUNTER — TELEPHONE (OUTPATIENT)
Dept: ENDOCRINOLOGY | Facility: CLINIC | Age: 66
End: 2021-08-18

## 2021-08-18 NOTE — TELEPHONE ENCOUNTER
Patient called and said her sugars are running really low, down to the 60s. She's having lots of trouble with her pump. Please advise.

## 2021-08-19 NOTE — TELEPHONE ENCOUNTER
Spoke with patient.  States that every night around 11pm she ends up having to eat something because her blood sugar drops to 50-60.  Has been occurring since she came home with pump.

## 2021-10-08 ENCOUNTER — LAB (OUTPATIENT)
Dept: LAB | Facility: HOSPITAL | Age: 66
End: 2021-10-08

## 2021-10-08 ENCOUNTER — OFFICE VISIT (OUTPATIENT)
Dept: ENDOCRINOLOGY | Facility: CLINIC | Age: 66
End: 2021-10-08

## 2021-10-08 VITALS
DIASTOLIC BLOOD PRESSURE: 64 MMHG | SYSTOLIC BLOOD PRESSURE: 118 MMHG | OXYGEN SATURATION: 100 % | HEART RATE: 70 BPM | HEIGHT: 61 IN | BODY MASS INDEX: 22.01 KG/M2 | WEIGHT: 116.6 LBS

## 2021-10-08 DIAGNOSIS — E03.9 ACQUIRED HYPOTHYROIDISM: ICD-10-CM

## 2021-10-08 DIAGNOSIS — E10.42 TYPE 1 DIABETES MELLITUS WITH DIABETIC POLYNEUROPATHY (HCC): ICD-10-CM

## 2021-10-08 DIAGNOSIS — E10.42 TYPE 1 DIABETES MELLITUS WITH DIABETIC POLYNEUROPATHY (HCC): Primary | ICD-10-CM

## 2021-10-08 LAB
ALBUMIN SERPL-MCNC: 4.1 G/DL (ref 3.5–5.2)
ALBUMIN UR-MCNC: 8.8 MG/DL
ALBUMIN/GLOB SERPL: 1.7 G/DL
ALP SERPL-CCNC: 94 U/L (ref 39–117)
ALT SERPL W P-5'-P-CCNC: 42 U/L (ref 1–33)
ANION GAP SERPL CALCULATED.3IONS-SCNC: 9.1 MMOL/L (ref 5–15)
AST SERPL-CCNC: 41 U/L (ref 1–32)
BILIRUB SERPL-MCNC: 0.4 MG/DL (ref 0–1.2)
BUN SERPL-MCNC: 21 MG/DL (ref 8–23)
BUN/CREAT SERPL: 23.9 (ref 7–25)
CALCIUM SPEC-SCNC: 8.9 MG/DL (ref 8.6–10.5)
CHLORIDE SERPL-SCNC: 97 MMOL/L (ref 98–107)
CHOLEST SERPL-MCNC: 155 MG/DL (ref 0–200)
CO2 SERPL-SCNC: 27.9 MMOL/L (ref 22–29)
CREAT SERPL-MCNC: 0.88 MG/DL (ref 0.57–1)
CREAT UR-MCNC: 86 MG/DL
EXPIRATION DATE: ABNORMAL
EXPIRATION DATE: NORMAL
GFR SERPL CREATININE-BSD FRML MDRD: 65 ML/MIN/1.73
GLOBULIN UR ELPH-MCNC: 2.4 GM/DL
GLUCOSE BLDC GLUCOMTR-MCNC: 255 MG/DL (ref 70–130)
GLUCOSE SERPL-MCNC: 193 MG/DL (ref 65–99)
HBA1C MFR BLD: 7 %
HDLC SERPL-MCNC: 52 MG/DL (ref 40–60)
LDLC SERPL CALC-MCNC: 86 MG/DL (ref 0–100)
LDLC/HDLC SERPL: 1.63 {RATIO}
Lab: ABNORMAL
Lab: NORMAL
MICROALBUMIN/CREAT UR: 102.3 MG/G
POTASSIUM SERPL-SCNC: 4.2 MMOL/L (ref 3.5–5.2)
PROT SERPL-MCNC: 6.5 G/DL (ref 6–8.5)
SODIUM SERPL-SCNC: 134 MMOL/L (ref 136–145)
TRIGL SERPL-MCNC: 90 MG/DL (ref 0–150)
TSH SERPL DL<=0.05 MIU/L-ACNC: 0.28 UIU/ML (ref 0.27–4.2)
VLDLC SERPL-MCNC: 17 MG/DL (ref 5–40)

## 2021-10-08 PROCEDURE — 82570 ASSAY OF URINE CREATININE: CPT

## 2021-10-08 PROCEDURE — 82043 UR ALBUMIN QUANTITATIVE: CPT

## 2021-10-08 PROCEDURE — 99214 OFFICE O/P EST MOD 30 MIN: CPT | Performed by: INTERNAL MEDICINE

## 2021-10-08 PROCEDURE — 3051F HG A1C>EQUAL 7.0%<8.0%: CPT | Performed by: INTERNAL MEDICINE

## 2021-10-08 PROCEDURE — 95251 CONT GLUC MNTR ANALYSIS I&R: CPT | Performed by: INTERNAL MEDICINE

## 2021-10-08 PROCEDURE — 84443 ASSAY THYROID STIM HORMONE: CPT

## 2021-10-08 PROCEDURE — 80053 COMPREHEN METABOLIC PANEL: CPT

## 2021-10-08 PROCEDURE — 83036 HEMOGLOBIN GLYCOSYLATED A1C: CPT | Performed by: INTERNAL MEDICINE

## 2021-10-08 PROCEDURE — 80061 LIPID PANEL: CPT

## 2021-10-08 NOTE — ASSESSMENT & PLAN NOTE
a1c of 7 is stable.  She is concerned about the nocturnal hypoglycemia although I don't see any of that on the pump in the last week  I will ask her to eat her supper earlier and to take a bedtime snack and I will reduce her night time basal .

## 2021-10-08 NOTE — PROGRESS NOTES
"     Office Note      Date: 10/08/2021  Patient Name: Sunday Mcclure  MRN: [id number]  : 1955    Chief Complaint   Patient presents with   • Diabetes       History of Present Illness:   Sunday Mcclure is a 66 y.o. female who presents for Diabetes - type 1  She uses insulin in a tandem pump with dexcom  Bg is check 288 times per day with dexcom.  Insulin doses are adjusted frequently based upon the readings.  Patient has occasional hypoglycemia.  Patient has been using the system during the last 3 months.    1 week of dexcom data were reviewed. On that, she has 13 minutes of hypoglyemia.. 11 of those were at 6 pm. None were in the middle of the night.  2 minutes were noon- 6pm    SHE STATES SHE IS GOING LOW ABOUT EVERY NIGHT BUT I JUST DON'T SEE THAT ON THE DEXCOM READINGS. THERE ARE SIMPLY NO LOWS IN THE MIDDLE OF THE NIGHT.    She does spike up after she eats her supper at 8pm and then her blood sugar goes down to an avera of 145 at midnight. .       Last time we had to increase her thyroid dose last time since her tsh was high   Last A1c:  Hemoglobin A1C   Date Value Ref Range Status   10/08/2021 7.0 % Final       Changes in health since last visit: none . Last eye exam every 6 month s.    Subjective          Review of Systems:   Review of Systems   Constitutional: Negative.  Negative for unexpected weight change.   HENT: Negative.    Eyes: Negative.    Respiratory: Negative.        The following portions of the patient's history were reviewed and updated as appropriate: allergies, current medications, past family history, past medical history, past social history, past surgical history and problem list.    Objective     Visit Vitals  /64 (BP Location: Left arm, Patient Position: Sitting, Cuff Size: Adult)   Pulse 70   Ht 154.9 cm (61\")   Wt 52.9 kg (116 lb 9.6 oz)   SpO2 100%   BMI 22.03 kg/m²       Labs:    CMP  Lab Results   Component Value Date    GLUCOSE 321 (H) 2015    BUN 19 2015 "    CREATININE 0.9 08/13/2015    K 4.1 08/13/2015    CO2 26 08/13/2015    CALCIUM 8.5 (L) 08/13/2015    AST 19 08/11/2015    ALT 12 08/11/2015        CBC w/DIFF  Lab Results   Component Value Date    WBC 5.18 08/12/2015    RBC 4.04 08/12/2015    HGB 10.8 (L) 08/12/2015    HCT 34.0 (L) 08/12/2015    MCV 84.2 08/12/2015    MCH 26.7 (L) 08/12/2015    MCHC 31.8 (L) 08/12/2015     08/12/2015    NEUTRORELPCT 48.1 08/10/2015    LYMPHORELPCT 32.7 08/10/2015    MONORELPCT 10.7 08/10/2015    EOSRELPCT 7.5 (H) 08/10/2015    BASORELPCT 0.8 08/10/2015    NEUTROABS 2.42 08/10/2015    LYMPHSABS 1.65 08/10/2015    MONOSABS 0.54 08/10/2015    EOSABS 0.38 (H) 08/10/2015    BASOSABS 0.04 08/10/2015       Physical Exam:  Physical Exam  Vitals reviewed.   Constitutional:       Appearance: Normal appearance.   Psychiatric:         Mood and Affect: Mood normal.         Thought Content: Thought content normal.         Judgment: Judgment normal.          Assessment / Plan      Assessment & Plan:  Problem List Items Addressed This Visit        Other    Hypothyroid    Overview     Surgery in 1990 for cancer- now cured          Current Assessment & Plan     tsh was 13 last time and we increased her dose. Will check tsh today and adjust          Relevant Medications    levothyroxine (Synthroid) 100 MCG tablet    Other Relevant Orders    TSH    Type 1 diabetes mellitus with diabetic polyneuropathy (HCC) - Primary    Current Assessment & Plan     a1c of 7 is stable.  She is concerned about the nocturnal hypoglycemia although I don't see any of that on the pump in the last week  I will ask her to eat her supper earlier and to take a bedtime snack and I will reduce her night time basal .         Relevant Medications    insulin aspart (novoLOG) 100 UNIT/ML injection    Other Relevant Orders    POC Glycosylated Hemoglobin (Hb A1C) (Completed)    POC Glucose, Blood (Completed)    Lipid Panel    Comprehensive Metabolic Panel    Microalbumin /  Creatinine Urine Ratio - Urine, Clean Catch           Lester Valles MD   10/08/2021

## 2021-11-08 NOTE — TELEPHONE ENCOUNTER
PATIENT WOULD LIKE A CALL BACK TO DISCUSS AN EYE SURGERY SHE IS GOING TO HAVE. SHE HAS SOME QUESTIONS ABOUT HER DIABETES WHILE HAVING SURGERY. SHE WILL NEED REFILLS ON TESTS (CONTOUR NEXT TEST STRIPS) AND INSULIN PENS. PATIENTS NUMBER [phone]

## 2021-11-08 NOTE — TELEPHONE ENCOUNTER
Patient is scheduled for eye surgery 11/16.  Has melanoma on the back of her eye.  They have told her not to wear her pump and she is wondering if you could call her in a pen to use.  Please advise.

## 2021-11-09 NOTE — PROGRESS NOTES
Chief Complaint   Patient presents with   • Follow-up     Pt here for cardiac follow up.  She is joined today in the room by her sister.  She denies CP, SOB, dizziness or palpitations.   Pt is having surgery on her right eye on 11/16/21.  Pt is taking a daily ASA.   • Med Refill     Pt needs cardiac meds refilled.  She request 90 day prescriptions to be sent to [facility].     • Lab Work     Pt's last labs were 10/8/21- they are in the chart.        Cardiac Complaints  none      Subjective   Sunday Mcclure is a 66 y.o. female with HTN, hyperlipidemia, DM, and IHD diagnosed in July 2013 when she underwent thrombectomy of the LAD followed by atherectomy and stenting. In 2015, she was admitted to [facility] after syncopal episode.  Workup showed normal EF and disease of the JACQUES. Brain scan did not show acute changes.  She then underwent physical therapy at home and had no residual effects on memory or mobility. Repeat cath on 6/15/17 showing previously placed stent to the proximal to mid LAD was patent but 99% disease of mid to distal LAD noted which was stented x 2.  She also had disease of the RCA with acceptable FFR with medical management. Lexiscan repeated in 2018 as well as 11/2019 for stable angina and CXR apparently showing significant LAD stent stenosis showed similar findings of apical infarct/jaime-infarct ischemia. Plan for cath and atherectomy if CP persisted.    Patient returns today for follow up and denies cardiac concerns. CP, SOA, dizziness, syncope, and palpitations denied. Right eye surgery on 11/16/2021 to be done. She continues with ASA without concerns. Since ranexa dosing has been decreased to 500mg BID, chest pain has improved. She is having a procedure on her right eye for primary uveal intraocular melanoma capable of mets. She radioactive I -25 eye plaque therapy, biopsy of intraocular tumor. Labs done October 2021 and showed: TRIG 184, HDL 52, LDL 86, , Na 134, K 4.2,  Creatinine 0.88, ALT 42, AST 41, TSH 0.276. Refills requested.       Cardiac History  Past Surgical History:   Procedure Laterality Date   • CARDIAC CATHETERIZATION  07/08/2013    EF 45%. 88% LAD- PTCA & Throbectomy. R/O Dissection    • CARDIAC CATHETERIZATION  07/24/2013    ( Dr. Singer) 85% LAD- Atherectomy & 3.5x32 Promus    • CARDIAC CATHETERIZATION  06/15/2017    99% LAD- 2.25x12 & 2.25x28 Promus. 40% RCA- FFR- 0.89   • CARDIOVASCULAR STRESS TEST  08/05/2013     L. Myoview- ([facility]) EF 50% Septal Infarct with jaime-infarct ischemia    • CARDIOVASCULAR STRESS TEST  11/07/2016    EF 65%, apical infarct with jaime-infarct ischemia, continue current   • CARDIOVASCULAR STRESS TEST  08/28/2018    L. Cardiolite- Apical Infarct with PeriInfarct Ischemia.   • CARDIOVASCULAR STRESS TEST  11/27/2019    L. Cardiolite- EF 64%. Apical Infarct with periinfarct Ischemia.   • ECHO - CONVERTED  07/09/2013     ([facility]) EF 35%    • ECHO - CONVERTED  08/05/2013    ([facility]) EF 40%    • ECHO - CONVERTED  08/11/2015    ([facility]) - EF 55-60%    • ECHO - CONVERTED  11/07/2016    EF 60-65%, mild AR, mild MR   • ECHO - CONVERTED  08/28/2018    EF 65%. Mild MR & AI. RVSP- 20 mmHg.   • ECHO - CONVERTED  11/27/2019    EF 65%. Apical WMA. Mild MR & AI.   • KNEE SURGERY     • US CAROTID UNILATERAL  11/03/2016    Less than 50% bilaterally       Current Outpatient Medications   Medication Sig Dispense Refill   • acyclovir (ZOVIRAX) 400 MG tablet Take 400 mg by mouth Daily. Take no more than 5 doses a day.     • aspirin 81 MG EC tablet Take 1 tablet by mouth Daily. 90 tablet 3   • atorvastatin (LIPITOR) 20 MG tablet Take 1 tablet by mouth Daily. 90 tablet 3   • ferrous sulfate 324 (65 Fe) MG tablet delayed-release EC tablet Take 325 mg by mouth Daily With Breakfast.     • Fiber-Lax 625 MG tablet Take 625 mg by mouth 3 (Three) Times a Day.     • glucose blood (Contour Next Test) test strip Test blood sugar 6 times daily. 200 each 1   • insulin  aspart (novoLOG) 100 UNIT/ML injection Per insulin pump as directed.     • insulin degludec (Tresiba FlexTouch) 100 UNIT/ML solution pen-injector injection Inject 8 Units under the skin into the appropriate area as directed Every Night. In case of pump failure 3 mL 0   • Insulin Pen Needle (BD Pen Needle Theresa 2nd Gen) 32G X 4 MM misc Use 1 daily with insulin injection 30 each 0   • isosorbide mononitrate (IMDUR) 30 MG 24 hr tablet Take 1 tablet by mouth Every Morning. 90 tablet 3   • LamoTRIgine (LAMICTAL XR PO) Take 300 mg by mouth Daily.     • nitroglycerin (NITROSTAT) 0.4 MG SL tablet Place 1 tablet under the tongue Every 5 (Five) Minutes As Needed for Chest Pain. Take no more than 3 doses in 15 minutes. 25 tablet 1   • oxybutynin XL (DITROPAN-XL) 5 MG 24 hr tablet Take 5 mg by mouth Daily.     • prasugrel (EFFIENT) 10 MG tablet Take 1 tablet by mouth Daily. 90 tablet 3   • pregabalin (LYRICA) 150 MG capsule Take 150 mg by mouth 2 (Two) Times a Day.     • ranolazine (RANEXA) 500 MG 12 hr tablet Take 1 tablet by mouth 2 (Two) Times a Day. 180 tablet 3   • senna-docusate (PERICOLACE) 8.6-50 MG per tablet Take 1 tablet by mouth Daily As Needed for Constipation.     • Synthroid 100 MCG tablet TAKE 1 TABLET ONCE DAILY 30 tablet 5   • venlafaxine (EFFEXOR) 75 MG tablet Take 75 mg by mouth Daily.     • venlafaxine XR (EFFEXOR-XR) 150 MG 24 hr capsule Take 150 mg by mouth Daily.     • Calcium Polycarbophil (FIBERTAB PO) Take 3 tablets by mouth Daily.     • senna (SENEXON) 8.6 MG tablet Take 2 tablets by mouth Every Night.       No current facility-administered medications for this visit.       Patient has no known allergies.    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)    • Anxiety    • Arthritis    • Breast cancer (HCC)     lumpectomy left breast, radiation   • DDD (degenerative disc disease), lumbar    • Diabetes  "(HCC)    • Diabetic neuropathy (HCC)    • H/O carpal tunnel repair     multiple surgeries   • H/O hand surgery     trigger finger   • H/O thyroidectomy     thyroid cancer   • H/O tubal ligation    • H/O:     • History of knee replacement     Left and right   • Hx of appendectomy    • Hypercholesterolemia    • Hypertension    • MRI head: No acute CVA seen 2015       Social History     Socioeconomic History   • Marital status:    Tobacco Use   • Smoking status: Never Smoker   • Smokeless tobacco: Never Used   Vaping Use   • Vaping Use: Never used   Substance and Sexual Activity   • Alcohol use: No   • Drug use: No   • Sexual activity: Defer       Family History   Problem Relation Age of Onset   • No Known Problems Mother    • Lymphoma Father    • Myasthenia gravis Sister        Review of Systems   Constitutional: Negative for malaise/fatigue and night sweats.   Cardiovascular: Negative for chest pain, claudication, dyspnea on exertion, irregular heartbeat, leg swelling, near-syncope, orthopnea, palpitations and syncope.   Respiratory: Negative for cough, shortness of breath and wheezing.    Musculoskeletal: Positive for stiffness. Negative for back pain and joint pain.   Gastrointestinal: Negative for anorexia, heartburn, melena, nausea and vomiting.   Genitourinary: Negative for dysuria, hematuria, hesitancy and nocturia.   Neurological: Negative for dizziness, headaches, light-headedness and weakness.   Psychiatric/Behavioral: Negative for depression and memory loss. The patient is not nervous/anxious.            Objective     /68 (BP Location: Left arm, Patient Position: Sitting)   Pulse 80   Temp 97.7 °F (36.5 °C)   Ht 154.9 cm (61\")   Wt 54.4 kg (120 lb)   BMI 22.67 kg/m²     Constitutional:       Appearance: Frail.   Eyes:      Pupils: Pupils are equal, round, and reactive to light.   HENT:      Nose: Nose normal.   Pulmonary:      Effort: Pulmonary effort is normal.      Breath " sounds: Normal breath sounds.   Cardiovascular:      PMI at left midclavicular line. Normal rate. Regular rhythm.      Murmurs: There is a systolic murmur.   Abdominal:      Palpations: Abdomen is soft.   Musculoskeletal: Normal range of motion.      Cervical back: Normal range of motion and neck supple. Skin:     General: Skin is warm and dry.   Neurological:      Mental Status: Alert and oriented to person, place and time.         Procedures    Assessment/Plan     IHD/abnormal stress test:  No new concerns voiced. Most recent stress in 2019 did show ischemia but chest pain denied. She will continue on DAPT therapy as bleeding and bruising denied. No changes to imdur therapy advised. Dizziness improved with lower ranexa dosing. She prefers to hold off on repeat workup as she has upcoming procedures to be done. We will discuss repeat workup to be considered at next visit, she was urged to call with concerns.       HTN:  Blood pressure stable. No changes to current recommended.  Limited sodium intake advised.     Hyperlipidemia:  On statin therapy with lipitor. No changes to current dosing recommended. Most recent FLP showed lipids at goal.     DM:  Type I DM, on insulin pump. AIC followed by your office, can we have most recent for review?     BMI noted at 22.67, good cardiac ADA diet with limited carb intake advised. She states she is doing much better as she has been limiting sugar/carb as she is now only eating 20 carbs daily.  She is walking at home without concerns.     Refills per request.     6 month follow up recommended or sooner if needed.        Problems Addressed this Visit        Cardiac and Vasculature    Coronary artery disease of native artery of native heart with stable angina pectoris (HCC)    Relevant Medications    ranolazine (RANEXA) 500 MG 12 hr tablet    prasugrel (EFFIENT) 10 MG tablet    isosorbide mononitrate (IMDUR) 30 MG 24 hr tablet    HTN (hypertension) - Primary    Hyperlipemia     Relevant Medications    atorvastatin (LIPITOR) 20 MG tablet    Non-rheumatic mitral regurgitation    Relevant Medications    ranolazine (RANEXA) 500 MG 12 hr tablet    prasugrel (EFFIENT) 10 MG tablet    isosorbide mononitrate (IMDUR) 30 MG 24 hr tablet    Presence of stent in LAD coronary artery    Relevant Medications    prasugrel (EFFIENT) 10 MG tablet    isosorbide mononitrate (IMDUR) 30 MG 24 hr tablet    IHD (ischemic heart disease)    Relevant Medications    ranolazine (RANEXA) 500 MG 12 hr tablet    prasugrel (EFFIENT) 10 MG tablet    isosorbide mononitrate (IMDUR) 30 MG 24 hr tablet       Endocrine and Metabolic    Hypothyroid    Type 1 diabetes mellitus with diabetic polyneuropathy (HCC)      Diagnoses       Codes Comments    Primary hypertension    -  Primary ICD-10-CM: I10  ICD-9-CM: 401.9     IHD (ischemic heart disease)     ICD-10-CM: I25.9  ICD-9-CM: 414.9     Coronary artery disease of native artery of native heart with stable angina pectoris (HCC)     ICD-10-CM: I25.118  ICD-9-CM: 414.01, 413.9     Presence of stent in LAD coronary artery     ICD-10-CM: Z95.5  ICD-9-CM: V45.82     Pure hypercholesterolemia     ICD-10-CM: E78.00  ICD-9-CM: 272.0     Non-rheumatic mitral regurgitation     ICD-10-CM: I34.0  ICD-9-CM: 424.0     Acquired hypothyroidism     ICD-10-CM: E03.9  ICD-9-CM: 244.9     Type 1 diabetes mellitus with diabetic polyneuropathy (HCC)     ICD-10-CM: E10.42  ICD-9-CM: 250.61, 357.2           Patient's Body mass index is 22.67 kg/m². indicating that she is WNW. Good cardiac ADA Diet advised with limited carbs, calories, and activity as tolerated advised.           Electronically signed by Margi Albright, SARAH November 9, 2021 17:40 EST

## 2021-11-11 NOTE — TELEPHONE ENCOUNTER
I spoke with pt about her question about what medications to stop, and told her you did not want her to stop any of her meds.  She wants to know if she needs to hold any prior to her eye surgery?

## 2021-11-15 NOTE — TELEPHONE ENCOUNTER
PATIENT IS HAVING AN EYE PROCEDURE TODAY. PATIENTS SON IS NEEDING CLARIFICATION ON PATIENTS INSULIN INSTRUCTION AS HE WILL BE HELPING CARE FOR HER DURING RECOVERY. PHONE NUMBER [phone]

## 2021-11-15 NOTE — TELEPHONE ENCOUNTER
Spoke to dr polo and kade.  She will take 8 units of tresiba tonight-d/c her pump.  Tomorrow after surgery they have a sliding scale:   bs 100-180 1 unit, 181-240 2 units, 241-300 3 units, 301-400 4 units, >400 5 units.  If she isnt going to eat subtract 1 unit per dose.  They will call prn during the next few days

## 2022-01-01 ENCOUNTER — OFFICE VISIT (OUTPATIENT)
Dept: ENDOCRINOLOGY | Facility: CLINIC | Age: 67
End: 2022-01-01

## 2022-01-01 ENCOUNTER — HOSPITAL ENCOUNTER (OUTPATIENT)
Dept: CARDIOLOGY | Facility: HOSPITAL | Age: 67
Discharge: HOME | End: 2022-06-22

## 2022-01-01 ENCOUNTER — OUTSIDE FACILITY SERVICE (OUTPATIENT)
Dept: CARDIOLOGY | Facility: CLINIC | Age: 67
End: 2022-01-01

## 2022-01-01 ENCOUNTER — TELEPHONE (OUTPATIENT)
Dept: ENDOCRINOLOGY | Facility: CLINIC | Age: 67
End: 2022-01-01

## 2022-01-01 ENCOUNTER — PRIOR AUTHORIZATION (OUTPATIENT)
Dept: ENDOCRINOLOGY | Facility: CLINIC | Age: 67
End: 2022-01-01

## 2022-01-01 ENCOUNTER — OFFICE VISIT (OUTPATIENT)
Dept: CARDIOLOGY | Facility: CLINIC | Age: 67
End: 2022-01-01

## 2022-01-01 VITALS
HEIGHT: 61 IN | OXYGEN SATURATION: 98 % | HEART RATE: 77 BPM | DIASTOLIC BLOOD PRESSURE: 66 MMHG | WEIGHT: 128 LBS | BODY MASS INDEX: 24.17 KG/M2 | SYSTOLIC BLOOD PRESSURE: 121 MMHG

## 2022-01-01 VITALS
DIASTOLIC BLOOD PRESSURE: 50 MMHG | BODY MASS INDEX: 24.35 KG/M2 | HEIGHT: 61 IN | SYSTOLIC BLOOD PRESSURE: 116 MMHG | OXYGEN SATURATION: 97 % | WEIGHT: 129 LBS | HEART RATE: 73 BPM

## 2022-01-01 VITALS
WEIGHT: 129.6 LBS | BODY MASS INDEX: 24.47 KG/M2 | HEART RATE: 88 BPM | SYSTOLIC BLOOD PRESSURE: 98 MMHG | HEIGHT: 61 IN | DIASTOLIC BLOOD PRESSURE: 58 MMHG

## 2022-01-01 VITALS
BODY MASS INDEX: 25.49 KG/M2 | RESPIRATION RATE: 16 BRPM | HEART RATE: 78 BPM | OXYGEN SATURATION: 96 % | HEIGHT: 61 IN | SYSTOLIC BLOOD PRESSURE: 124 MMHG | WEIGHT: 135 LBS | DIASTOLIC BLOOD PRESSURE: 62 MMHG

## 2022-01-01 DIAGNOSIS — I25.118 CORONARY ARTERY DISEASE OF NATIVE ARTERY OF NATIVE HEART WITH STABLE ANGINA PECTORIS: ICD-10-CM

## 2022-01-01 DIAGNOSIS — R01.1 HEART MURMUR: ICD-10-CM

## 2022-01-01 DIAGNOSIS — I25.9 IHD (ISCHEMIC HEART DISEASE): ICD-10-CM

## 2022-01-01 DIAGNOSIS — Z95.5 PRESENCE OF STENT IN LAD CORONARY ARTERY: ICD-10-CM

## 2022-01-01 DIAGNOSIS — E78.00 PURE HYPERCHOLESTEROLEMIA: ICD-10-CM

## 2022-01-01 DIAGNOSIS — R07.89 CHEST DISCOMFORT: ICD-10-CM

## 2022-01-01 DIAGNOSIS — R06.02 SHORTNESS OF BREATH: ICD-10-CM

## 2022-01-01 DIAGNOSIS — R01.1 HEART MURMUR: Primary | ICD-10-CM

## 2022-01-01 DIAGNOSIS — E10.42 TYPE 1 DIABETES MELLITUS WITH DIABETIC POLYNEUROPATHY: Primary | ICD-10-CM

## 2022-01-01 DIAGNOSIS — I20.8 ANGINAL EQUIVALENT: ICD-10-CM

## 2022-01-01 DIAGNOSIS — E10.42 TYPE 1 DIABETES MELLITUS WITH DIABETIC POLYNEUROPATHY: ICD-10-CM

## 2022-01-01 LAB
AORTIC DIMENSIONLESS INDEX: 0.87 (DI)
BH CV ECHO MEAS - ACS: 1.71 CM
BH CV ECHO MEAS - AI P1/2T: 528.5 MSEC
BH CV ECHO MEAS - AO MAX PG: 5.1 MMHG
BH CV ECHO MEAS - AO MEAN PG: 2.6 MMHG
BH CV ECHO MEAS - AO ROOT DIAM: 3 CM
BH CV ECHO MEAS - AO V2 MAX: 112.7 CM/SEC
BH CV ECHO MEAS - AO V2 VTI: 28.5 CM
BH CV ECHO MEAS - AVA(I,D): 2.3 CM2
BH CV ECHO MEAS - EDV(CUBED): 34.6 ML
BH CV ECHO MEAS - EDV(MOD-SP4): 37.3 ML
BH CV ECHO MEAS - EF(MOD-BP): 65 %
BH CV ECHO MEAS - EF(MOD-SP4): 66.2 %
BH CV ECHO MEAS - ESV(CUBED): 9.5 ML
BH CV ECHO MEAS - ESV(MOD-SP4): 12.6 ML
BH CV ECHO MEAS - FS: 35 %
BH CV ECHO MEAS - IVS/LVPW: 1.27 CM
BH CV ECHO MEAS - IVSD: 1.38 CM
BH CV ECHO MEAS - LA DIMENSION: 3.1 CM
BH CV ECHO MEAS - LAT PEAK E' VEL: 5.1 CM/SEC
BH CV ECHO MEAS - LV DIASTOLIC VOL/BSA (35-75): 23.8 CM2
BH CV ECHO MEAS - LV MASS(C)D: 128.3 GRAMS
BH CV ECHO MEAS - LV MAX PG: 3.9 MMHG
BH CV ECHO MEAS - LV MEAN PG: 1.34 MMHG
BH CV ECHO MEAS - LV SYSTOLIC VOL/BSA (12-30): 8 CM2
BH CV ECHO MEAS - LV V1 MAX: 99 CM/SEC
BH CV ECHO MEAS - LV V1 VTI: 21.4 CM
BH CV ECHO MEAS - LVIDD: 3.3 CM
BH CV ECHO MEAS - LVIDS: 2.12 CM
BH CV ECHO MEAS - LVOT AREA: 3.1 CM2
BH CV ECHO MEAS - LVOT DIAM: 1.97 CM
BH CV ECHO MEAS - LVPWD: 1.09 CM
BH CV ECHO MEAS - MED PEAK E' VEL: 4.2 CM/SEC
BH CV ECHO MEAS - MV A MAX VEL: 121.3 CM/SEC
BH CV ECHO MEAS - MV DEC SLOPE: 307.5 CM/SEC2
BH CV ECHO MEAS - MV E MAX VEL: 90.8 CM/SEC
BH CV ECHO MEAS - MV E/A: 0.75
BH CV ECHO MEAS - MV MAX PG: 6.9 MMHG
BH CV ECHO MEAS - MV MEAN PG: 2.25 MMHG
BH CV ECHO MEAS - MV P1/2T: 99.6 MSEC
BH CV ECHO MEAS - MV V2 VTI: 42.8 CM
BH CV ECHO MEAS - MVA(P1/2T): 2.21 CM2
BH CV ECHO MEAS - MVA(VTI): 1.53 CM2
BH CV ECHO MEAS - PA V2 MAX: 96.4 CM/SEC
BH CV ECHO MEAS - RAP SYSTOLE: 10 MMHG
BH CV ECHO MEAS - RV MAX PG: 2.8 MMHG
BH CV ECHO MEAS - RV V1 MAX: 84 CM/SEC
BH CV ECHO MEAS - RV V1 VTI: 18.5 CM
BH CV ECHO MEAS - RVDD: 3.3 CM
BH CV ECHO MEAS - RVSP: 38.9 MMHG
BH CV ECHO MEAS - SI(MOD-SP4): 15.8 ML/M2
BH CV ECHO MEAS - SV(LVOT): 65.5 ML
BH CV ECHO MEAS - SV(MOD-SP4): 24.7 ML
BH CV ECHO MEAS - TR MAX PG: 28.9 MMHG
BH CV ECHO MEAS - TR MAX VEL: 268.8 CM/SEC
BH CV ECHO MEASUREMENTS AVERAGE E/E' RATIO: 19.53
BH CV REST NUCLEAR ISOTOPE DOSE: 10 MCI
BH CV STRESS COMMENTS STAGE 1: NORMAL
BH CV STRESS DOSE REGADENOSON STAGE 1: 0.4
BH CV STRESS DURATION MIN STAGE 1: 0
BH CV STRESS DURATION SEC STAGE 1: 10
BH CV STRESS NUCLEAR ISOTOPE DOSE: 30 MCI
BH CV STRESS PROTOCOL 1: NORMAL
BH CV STRESS RECOVERY BP: NORMAL MMHG
BH CV STRESS RECOVERY HR: 74 BPM
BH CV STRESS STAGE 1: 1
EXPIRATION DATE: ABNORMAL
EXPIRATION DATE: ABNORMAL
EXPIRATION DATE: NORMAL
GLUCOSE BLDC GLUCOMTR-MCNC: 180 MG/DL (ref 70–130)
GLUCOSE BLDC GLUCOMTR-MCNC: 220 MG/DL (ref 70–130)
GLUCOSE BLDC GLUCOMTR-MCNC: 257 MG/DL (ref 70–130)
HBA1C MFR BLD: 7.2 %
HBA1C MFR BLD: 7.8 %
HBA1C MFR BLD: 8 %
IVRT: 71 MSEC
LEFT ATRIUM VOLUME INDEX: 20.4 ML/M2
LV EF NUC BP: 61 %
Lab: ABNORMAL
Lab: ABNORMAL
Lab: NORMAL
MAXIMAL PREDICTED HEART RATE: 155 BPM
MAXIMAL PREDICTED HEART RATE: 155 BPM
PERCENT MAX PREDICTED HR: 45.16 %
STRESS BASELINE BP: NORMAL MMHG
STRESS BASELINE HR: 71 BPM
STRESS PERCENT HR: 53 %
STRESS POST PEAK BP: NORMAL MMHG
STRESS POST PEAK HR: 70 BPM
STRESS TARGET HR: 132 BPM
STRESS TARGET HR: 132 BPM

## 2022-01-01 PROCEDURE — 0 TECHNETIUM SESTAMIBI: Performed by: INTERNAL MEDICINE

## 2022-01-01 PROCEDURE — 99214 OFFICE O/P EST MOD 30 MIN: CPT | Performed by: NURSE PRACTITIONER

## 2022-01-01 PROCEDURE — 83036 HEMOGLOBIN GLYCOSYLATED A1C: CPT | Performed by: INTERNAL MEDICINE

## 2022-01-01 PROCEDURE — 99213 OFFICE O/P EST LOW 20 MIN: CPT | Performed by: INTERNAL MEDICINE

## 2022-01-01 PROCEDURE — 95251 CONT GLUC MNTR ANALYSIS I&R: CPT | Performed by: INTERNAL MEDICINE

## 2022-01-01 PROCEDURE — 93306 TTE W/DOPPLER COMPLETE: CPT | Performed by: INTERNAL MEDICINE

## 2022-01-01 PROCEDURE — 3051F HG A1C>EQUAL 7.0%<8.0%: CPT | Performed by: INTERNAL MEDICINE

## 2022-01-01 PROCEDURE — 93018 CV STRESS TEST I&R ONLY: CPT | Performed by: INTERNAL MEDICINE

## 2022-01-01 PROCEDURE — 78452 HT MUSCLE IMAGE SPECT MULT: CPT | Performed by: INTERNAL MEDICINE

## 2022-01-01 PROCEDURE — 99214 OFFICE O/P EST MOD 30 MIN: CPT | Performed by: INTERNAL MEDICINE

## 2022-01-01 PROCEDURE — 82962 GLUCOSE BLOOD TEST: CPT | Performed by: INTERNAL MEDICINE

## 2022-01-01 PROCEDURE — 25010000002 REGADENOSON 0.4 MG/5ML SOLUTION: Performed by: INTERNAL MEDICINE

## 2022-01-01 PROCEDURE — 78452 HT MUSCLE IMAGE SPECT MULT: CPT

## 2022-01-01 PROCEDURE — A9500 TC99M SESTAMIBI: HCPCS | Performed by: INTERNAL MEDICINE

## 2022-01-01 PROCEDURE — 3052F HG A1C>EQUAL 8.0%<EQUAL 9.0%: CPT | Performed by: INTERNAL MEDICINE

## 2022-01-01 PROCEDURE — 93017 CV STRESS TEST TRACING ONLY: CPT

## 2022-01-01 PROCEDURE — 93306 TTE W/DOPPLER COMPLETE: CPT

## 2022-01-01 RX ORDER — ISOSORBIDE MONONITRATE 30 MG/1
30 TABLET, EXTENDED RELEASE ORAL EVERY MORNING
Qty: 90 TABLET | Refills: 3 | Status: SHIPPED | OUTPATIENT
Start: 2022-01-01 | End: 2022-01-01 | Stop reason: DRUGHIGH

## 2022-01-01 RX ORDER — LEVOTHYROXINE SODIUM 100 MCG
TABLET ORAL
Qty: 90 TABLET | Refills: 1 | Status: SHIPPED | OUTPATIENT
Start: 2022-01-01

## 2022-01-01 RX ORDER — NITROGLYCERIN 0.4 MG/1
0.4 TABLET SUBLINGUAL
Qty: 25 TABLET | Refills: 1 | Status: SHIPPED | OUTPATIENT
Start: 2022-01-01

## 2022-01-01 RX ORDER — INSULIN DEGLUDEC INJECTION 100 U/ML
8 INJECTION, SOLUTION SUBCUTANEOUS NIGHTLY
Qty: 3 ML | Refills: 0 | Status: SHIPPED | OUTPATIENT
Start: 2022-01-01

## 2022-01-01 RX ORDER — BISACODYL 5 MG
TABLET, DELAYED RELEASE (ENTERIC COATED) ORAL
COMMUNITY
Start: 2022-01-01

## 2022-01-01 RX ORDER — MAGNESIUM CARB/ALUMINUM HYDROX 105-160MG
TABLET,CHEWABLE ORAL
COMMUNITY
Start: 2022-01-01

## 2022-01-01 RX ORDER — PEN NEEDLE, DIABETIC 32GX 5/32"
NEEDLE, DISPOSABLE MISCELLANEOUS
Qty: 400 EACH | Refills: 3 | Status: SHIPPED | OUTPATIENT
Start: 2022-01-01

## 2022-01-01 RX ORDER — ASPIRIN 81 MG/1
TABLET, COATED ORAL
Qty: 30 TABLET | Refills: 3 | Status: SHIPPED | OUTPATIENT
Start: 2022-01-01

## 2022-01-01 RX ORDER — ASPIRIN 81 MG/1
TABLET, COATED ORAL
Qty: 30 TABLET | Refills: 3 | Status: SHIPPED | OUTPATIENT
Start: 2022-01-01 | End: 2022-01-01

## 2022-01-01 RX ORDER — INSULIN ASPART 100 [IU]/ML
INJECTION, SOLUTION INTRAVENOUS; SUBCUTANEOUS
Qty: 30 ML | Refills: 5 | Status: SHIPPED | OUTPATIENT
Start: 2022-01-01

## 2022-01-01 RX ORDER — RANOLAZINE 500 MG/1
TABLET, EXTENDED RELEASE ORAL
Qty: 120 TABLET | Refills: 1 | Status: SHIPPED | OUTPATIENT
Start: 2022-01-01

## 2022-01-01 RX ORDER — RANOLAZINE 500 MG/1
TABLET, EXTENDED RELEASE ORAL
Qty: 120 TABLET | Refills: 8 | Status: SHIPPED | OUTPATIENT
Start: 2022-01-01 | End: 2022-01-01 | Stop reason: SDUPTHER

## 2022-01-01 RX ORDER — RANOLAZINE 500 MG/1
1000 TABLET, EXTENDED RELEASE ORAL 2 TIMES DAILY
Qty: 120 TABLET | Refills: 2 | Status: SHIPPED | OUTPATIENT
Start: 2022-01-01 | End: 2022-01-01

## 2022-01-01 RX ORDER — PRASUGREL 10 MG/1
10 TABLET, FILM COATED ORAL DAILY
Qty: 90 TABLET | Refills: 3 | Status: SHIPPED | OUTPATIENT
Start: 2022-01-01

## 2022-01-01 RX ORDER — ISOSORBIDE MONONITRATE 60 MG/1
60 TABLET, EXTENDED RELEASE ORAL EVERY MORNING
Qty: 90 TABLET | Refills: 3 | Status: SHIPPED | OUTPATIENT
Start: 2022-01-01

## 2022-01-01 RX ORDER — PEN NEEDLE, DIABETIC 31 GX5/16"
NEEDLE, DISPOSABLE MISCELLANEOUS
Qty: 200 EACH | Refills: 4 | Status: SHIPPED | OUTPATIENT
Start: 2022-01-01

## 2022-01-01 RX ADMIN — REGADENOSON 0.4 MG: 0.08 INJECTION, SOLUTION INTRAVENOUS at 10:31

## 2022-01-01 RX ADMIN — TECHNETIUM TC 99M SESTAMIBI 1 DOSE: 1 INJECTION INTRAVENOUS at 10:31

## 2022-01-01 RX ADMIN — TECHNETIUM TC 99M SESTAMIBI 1 DOSE: 1 INJECTION INTRAVENOUS at 08:45

## 2022-01-19 NOTE — TELEPHONE ENCOUNTER
PT CALLED STATING [facility] WILL NOT FILL HER DEXCOM SUPPLIES UNTIL WE REACH OUT TO THEM. PLEASE REACH OUT TO THEM @ PHONE # [phone], AND THEN THE PT. THANK YOU

## 2022-01-27 NOTE — PROGRESS NOTES
"     Office Note      Date: 2022  Patient Name: Sunday Mcclure  MRN: [id number]  : 1955    Chief Complaint   Patient presents with   • Diabetes       History of Present Illness:   Sunday Mcclure is a 66 y.o. female who presents for Diabetes - type 1  She uses insulin in a tandem pump and has a dexcom  Bg is check 288 times per day with dexcom.  Insulin doses are adjusted frequently based upon the readings.  Patient has occasional hypoglycemia.  Patient has been using the system during the last 3 months.  She uses control IQ  She has adjust her doses she says and got everything all messed up.  She wants to know how to override the suggested boluses (she should have trained how to do that)  She has low reaction at 10-12 every night     2 weeks of dexcom data were reviwed. She had 14 minutes of severe hypoglycemia during that time. Her hyperglycemia is mild and occurs after meals  She is modestly hyperglycemic overnight- averaging about 160     Last A1c:  Hemoglobin A1C   Date Value Ref Range Status   2022 8.0 % Final       Changes in health since last visit: none . Last eye exam  Up to date .    Subjective        .    Review of Systems:   Review of Systems   Constitutional: Positive for unexpected weight change.       The following portions of the patient's history were reviewed and updated as appropriate: allergies, current medications, past family history, past medical history, past social history, past surgical history and problem list.    Objective     Visit Vitals  /66   Pulse 77   Ht 154.9 cm (61\")   Wt 58.1 kg (128 lb)   SpO2 98%   BMI 24.19 kg/m²       Labs:    CMP  Lab Results   Component Value Date    GLUCOSE 193 (H) 10/08/2021    BUN 21 10/08/2021    CREATININE 0.88 10/08/2021    EGFRIFNONA 65 10/08/2021    BCR 23.9 10/08/2021    K 4.2 10/08/2021    CO2 27.9 10/08/2021    CALCIUM 8.9 10/08/2021    AST 41 (H) 10/08/2021    ALT 42 (H) 10/08/2021        CBC w/DIFF  Lab Results "   Component Value Date    WBC 5.18 08/12/2015    RBC 4.04 08/12/2015    HGB 10.8 (L) 08/12/2015    HCT 34.0 (L) 08/12/2015    MCV 84.2 08/12/2015    MCH 26.7 (L) 08/12/2015    MCHC 31.8 (L) 08/12/2015     08/12/2015    NEUTRORELPCT 48.1 08/10/2015    LYMPHORELPCT 32.7 08/10/2015    MONORELPCT 10.7 08/10/2015    EOSRELPCT 7.5 (H) 08/10/2015    BASORELPCT 0.8 08/10/2015    NEUTROABS 2.42 08/10/2015    LYMPHSABS 1.65 08/10/2015    MONOSABS 0.54 08/10/2015    EOSABS 0.38 (H) 08/10/2015    BASOSABS 0.04 08/10/2015       Physical Exam:  Physical Exam  Vitals reviewed.   Constitutional:       Appearance: Normal appearance.   Neurological:      Mental Status: She is alert.   Psychiatric:         Mood and Affect: Mood normal.         Thought Content: Thought content normal.         Judgment: Judgment normal.          Assessment / Plan      Assessment & Plan:  Problem List Items Addressed This Visit        Other    Type 1 diabetes mellitus with diabetic polyneuropathy (HCC) - Primary    Current Assessment & Plan     Deteriorated with higher a1c.  Based upon her dexcom data I made a small increase in her nocturnal basal rate.  I implored her to let the pump system do the work for her. She had been giving extra boluses after meals to treat what is modest hyperglycemia and then she goes low. Is she just would let the pump fix the high blood sugar, the lows would go away            Relevant Medications    insulin aspart (novoLOG) 100 UNIT/ML injection    insulin degludec (Tresiba FlexTouch) 100 UNIT/ML solution pen-injector injection    Other Relevant Orders    POC Glucose, Blood (Completed)    POC Glycosylated Hemoglobin (Hb A1C) (Completed)           Lester Valles MD   01/27/2022

## 2022-01-27 NOTE — ASSESSMENT & PLAN NOTE
Deteriorated with higher a1c.  Based upon her dexcom data I made a small increase in her nocturnal basal rate.  I implored her to let the pump system do the work for her. She had been giving extra boluses after meals to treat what is modest hyperglycemia and then she goes low. Is she just would let the pump fix the high blood sugar, the lows would go away

## 2022-02-09 NOTE — TELEPHONE ENCOUNTER
Pharmacy called requesting refills on Tresiba flextouch 100 unit/mL solution pen injection also pt needs Novolog 100 unit/mL injection Pt last seen 01/27/22 pt next aoot 05/05/22

## 2022-05-05 NOTE — PROGRESS NOTES
"     Office Note      Date: 2022  Patient Name: Sunday Mcclure  MRN: [id number]  : 1955    Chief Complaint   Patient presents with   • Diabetes     Type I   • Hyperlipidemia   • Hypothyroidism   • Hypertension       History of Present Illness:   Sunday Mcclure is a 67 y.o. female who presents for Diabetes - type 1  On insulin in a tandem pump with control IQ.  Bg is check 288 times per day with dexcom.  Insulin doses are adjusted frequently based upon the readings.  Patient has occasional hypoglycemia.  Patient has been using the system during the last 3 months.  2 weeks of dexcom data were revioewed.   4 minutes of nocturnal hypoglycemia noted  Mild post prandial hyperglycemia note  Overall good pattern     Last A1c:  Hemoglobin A1C   Date Value Ref Range Status   2022 7.8 % Final       Changes in health since last visit: none . Last eye exam up to date.    Subjective        Review of Systems:   Review of Systems   Constitutional: Negative.    HENT: Negative.    Eyes: Negative.    Respiratory: Negative.        The following portions of the patient's history were reviewed and updated as appropriate: allergies, current medications, past family history, past medical history, past social history, past surgical history and problem list.    Objective     Visit Vitals  /50 (BP Location: Left arm, Patient Position: Sitting, Cuff Size: Adult)   Pulse 73   Ht 154.9 cm (61\")   Wt 58.5 kg (129 lb)   SpO2 97%   BMI 24.37 kg/m²       Labs:    CMP  Lab Results   Component Value Date    GLUCOSE 193 (H) 10/08/2021    BUN 22 2022    CREATININE 1.03 2022    EGFRIFNONA 54 (L) 2022    EGFRIFAFRI >60 2022    BCR 21 2022    K 5.1 (H) 2022    CO2 27 2022    CALCIUM 8.8 (L) 2022    AST 54 (H) 2022    ALT 38 (H) 2022        CBC w/DIFF  Lab Results   Component Value Date    WBC 6.55 2022    RBC 4.34 2022    HGB 12.2 2022    HCT 37.5 " 03/31/2022    MCV 86 03/31/2022    MCH 28.1 03/31/2022    MCHC 32.5 03/31/2022    RDW 13.0 03/31/2022    MPV 10.6 03/31/2022     03/31/2022    NEUTRORELPCT 55.0 03/31/2022    LYMPHORELPCT 33.0 03/31/2022    MONORELPCT 8.0 03/31/2022    EOSRELPCT 3.0 03/31/2022    BASORELPCT 1.0 03/31/2022    AUTOIGPER 0.0 03/31/2022    NEUTROABS 3.56 03/31/2022    LYMPHSABS 2.17 03/31/2022    MONOSABS 0.53 03/31/2022    EOSABS 0.20 03/31/2022    BASOSABS 0.07 03/31/2022    AUTOIGNUM 0.02 03/31/2022    NRBC 0.0 03/31/2022       Physical Exam:  Physical Exam  Vitals reviewed.   Constitutional:       Appearance: Normal appearance. She is normal weight.   Cardiovascular:      Pulses:           Dorsalis pedis pulses are 2+ on the right side and 2+ on the left side.        Posterior tibial pulses are 2+ on the right side and 2+ on the left side.   Musculoskeletal:      Right foot: Normal range of motion. No deformity, bunion, Charcot foot, foot drop or prominent metatarsal heads.      Left foot: Normal range of motion. No deformity, bunion, Charcot foot, foot drop or prominent metatarsal heads.   Feet:      Right foot:      Protective Sensation: 10 sites tested. 6 sites sensed.      Skin integrity: Callus present.      Toenail Condition: Right toenails are normal.      Left foot:      Protective Sensation: 10 sites tested. 6 sites sensed.      Skin integrity: Callus present.      Toenail Condition: Left toenails are normal.      Comments: Diabetic Foot Exam Performed and Monofilament Test Performed    Neurological:      Mental Status: She is alert.   Psychiatric:         Mood and Affect: Mood normal.         Thought Content: Thought content normal.         Judgment: Judgment normal.          Assessment / Plan      Assessment & Plan:  Problem List Items Addressed This Visit        Other    Type 1 diabetes mellitus with diabetic polyneuropathy (HCC) - Primary    Current Assessment & Plan     Diabetes is improving with treatment.    Continue current treatment regimen.  Diabetes will be reassessed in 3 months            Relevant Medications    insulin aspart (novoLOG) 100 UNIT/ML injection    insulin degludec (Tresiba FlexTouch) 100 UNIT/ML solution pen-injector injection    Other Relevant Orders    POC Glucose, Blood (Completed)    POC Glycosylated Hemoglobin (Hb A1C) (Completed)           Lester Valles MD   05/05/2022

## 2022-05-05 NOTE — ASSESSMENT & PLAN NOTE
Diabetes is improving with treatment.   Continue current treatment regimen.  Diabetes will be reassessed in 3 months

## 2022-05-17 NOTE — PROGRESS NOTES
Chief Complaint   Patient presents with   • Follow-up     Pt is here for cardiac follow up.  Pt denies CP, SOB, dizziness or palpitations.  Pt does take a daily ASA.   • Med Refill     Pt request 30 day refills to be sent to the [facility].  Pt's medications were verified by medication bottles during today's OV.   • Lab Work     Pt states her last labs were completed about 3 months ago with her PCP.       Cardiac Complaints  chest pressure/discomfort and dyspnea      Subjective   Sunday Mcclure is a 67 y.o. female with HTN, hyperlipidemia, DM, and IHD diagnosed in July 2013 when she underwent thrombectomy of the LAD followed by atherectomy and stenting. In 2015, she was admitted to [facility] after syncopal episode.  Workup showed normal EF and disease of the JACQUES. Brain scan did not show acute changes.  She then underwent physical therapy at home and had no residual effects on memory or mobility. Repeat cath on 6/15/17 showing previously placed stent to the proximal to mid LAD was patent but 99% disease of mid to distal LAD noted which was stented x 2.  She also had disease of the RCA with acceptable FFR with medical management. Lexiscan repeated in 2018 as well as 11/2019 for stable angina and CXR apparently showing significant LAD stent stenosis showed similar findings of apical infarct/jaime-infarct ischemia. Plan for cath and atherectomy if CP persisted.    Patient returns today for follow up and reports doing well from a cardiac standpoint. She denies palpitations, dizziness, and syncope.  She does admit to SOA at random times where she feels like will take her breath away and feels tight in her chest.  She does report some rare chest pain over the last sixth months where she took a NTG each time and pain resolved.  Labs she reports have been followed by PCP and were checked last 3 months ago with PCP, no current available. Refills requested.             Cardiac History  Past Surgical History:    Procedure Laterality Date   • CARDIAC CATHETERIZATION  07/08/2013    EF 45%. 88% LAD- PTCA & Throbectomy. R/O Dissection    • CARDIAC CATHETERIZATION  07/24/2013    ( Dr. Singer) 85% LAD- Atherectomy & 3.5x32 Promus    • CARDIAC CATHETERIZATION  06/15/2017    99% LAD- 2.25x12 & 2.25x28 Promus. 40% RCA- FFR- 0.89   • CARDIOVASCULAR STRESS TEST  08/05/2013     L. Myoview- ([facility]) EF 50% Septal Infarct with jaime-infarct ischemia    • CARDIOVASCULAR STRESS TEST  11/07/2016    EF 65%, apical infarct with jaime-infarct ischemia, continue current   • CARDIOVASCULAR STRESS TEST  08/28/2018    L. Cardiolite- Apical Infarct with PeriInfarct Ischemia.   • CARDIOVASCULAR STRESS TEST  11/27/2019    L. Cardiolite- EF 64%. Apical Infarct with periinfarct Ischemia.   • ECHO - CONVERTED  07/09/2013     ([facility]) EF 35%    • ECHO - CONVERTED  08/05/2013    ([facility]) EF 40%    • ECHO - CONVERTED  08/11/2015    ([facility]) - EF 55-60%    • ECHO - CONVERTED  11/07/2016    EF 60-65%, mild AR, mild MR   • ECHO - CONVERTED  08/28/2018    EF 65%. Mild MR & AI. RVSP- 20 mmHg.   • ECHO - CONVERTED  11/27/2019    EF 65%. Apical WMA. Mild MR & AI.   • EYE SURGERY Right     Melanoma excision   • KNEE SURGERY     • US CAROTID UNILATERAL  11/03/2016    Less than 50% bilaterally       Current Outpatient Medications   Medication Sig Dispense Refill   • acyclovir (ZOVIRAX) 400 MG tablet Take 400 mg by mouth Daily. Take no more than 5 doses a day.     • Aspirin Low Dose 81 MG EC tablet TAKE 1 TABLET BY MOUTH DAILY. 30 tablet 3   • atorvastatin (LIPITOR) 20 MG tablet Take 1 tablet by mouth Daily. 90 tablet 3   • ferrous sulfate 324 (65 Fe) MG tablet delayed-release EC tablet Take 325 mg by mouth Daily With Breakfast.     • Fiber-Lax 625 MG tablet Take 625 mg by mouth 3 (Three) Times a Day.     • glucose blood (Contour Next Test) test strip Test blood sugar 6 times daily. 200 each 1   • insulin aspart (novoLOG) 100 UNIT/ML injection Per insulin pump as  directed max daily dose 100 units 30 mL 5   • insulin degludec (Tresiba FlexTouch) 100 UNIT/ML solution pen-injector injection Inject 8 Units under the skin into the appropriate area as directed Every Night. In case of pump failure 3 mL 0   • Insulin Pen Needle (BD Pen Needle Theresa 2nd Gen) 32G X 4 MM misc Use 1 daily with insulin injection 30 each 0   • isosorbide mononitrate (IMDUR) 30 MG 24 hr tablet Take 1 tablet by mouth Every Morning. 90 tablet 3   • LamoTRIgine (LAMICTAL XR PO) Take 300 mg by mouth Daily.     • nitroglycerin (NITROSTAT) 0.4 MG SL tablet Place 1 tablet under the tongue Every 5 (Five) Minutes As Needed for Chest Pain. Take no more than 3 doses in 15 minutes. 25 tablet 1   • oxybutynin XL (DITROPAN-XL) 5 MG 24 hr tablet Take 5 mg by mouth Daily.     • prasugrel (EFFIENT) 10 MG tablet Take 1 tablet by mouth Daily. 90 tablet 3   • pregabalin (LYRICA) 150 MG capsule Take 150 mg by mouth 2 (Two) Times a Day.     • ranolazine (RANEXA) 500 MG 12 hr tablet Take 2 tablets by mouth 2 (Two) Times a Day. 120 tablet 2   • senna-docusate (PERICOLACE) 8.6-50 MG per tablet Take 1 tablet by mouth Daily As Needed for Constipation.     • Synthroid 100 MCG tablet TAKE 1 TABLET ONCE DAILY 90 tablet 1   • venlafaxine (EFFEXOR) 75 MG tablet Take 75 mg by mouth Daily.     • venlafaxine XR (EFFEXOR-XR) 150 MG 24 hr capsule Take 150 mg by mouth Daily.       No current facility-administered medications for this visit.       Patient has no known allergies.    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)    • Anxiety    • Arthritis    • Breast cancer (HCC)     lumpectomy left breast, radiation   • DDD (degenerative disc disease), lumbar    • Diabetes (HCC)    • Diabetes mellitus type I (HCC)    • Diabetic neuropathy (HCC)    • H/O carpal tunnel repair     multiple surgeries   • H/O eye surgery     removed melanoma   • H/O  "hand surgery     trigger finger   • H/O thyroidectomy     thyroid cancer   • H/O tubal ligation    • H/O:     • History of knee replacement     Left and right   • Hx of appendectomy    • Hypercholesterolemia    • Hypertension    • MRI head: No acute CVA seen 2015       Social History     Socioeconomic History   • Marital status:    Tobacco Use   • Smoking status: Never Smoker   • Smokeless tobacco: Never Used   Vaping Use   • Vaping Use: Never used   Substance and Sexual Activity   • Alcohol use: No   • Drug use: No   • Sexual activity: Defer       Family History   Problem Relation Age of Onset   • No Known Problems Mother    • Lymphoma Father    • Myasthenia gravis Sister        Review of Systems   Constitutional: Negative for malaise/fatigue and night sweats.   Cardiovascular: Positive for chest pain and dyspnea on exertion. Negative for claudication, irregular heartbeat, leg swelling, near-syncope, orthopnea, palpitations and syncope.   Respiratory: Positive for shortness of breath. Negative for cough and wheezing.    Musculoskeletal: Positive for stiffness. Negative for back pain and joint pain.   Gastrointestinal: Negative for anorexia, heartburn, nausea and vomiting.   Genitourinary: Negative for dysuria, hematuria, hesitancy and nocturia.   Neurological: Negative for dizziness, light-headedness and loss of balance.   Psychiatric/Behavioral: Negative for depression and memory loss. The patient is not nervous/anxious.            Objective     BP 98/58 (BP Location: Left arm, Patient Position: Sitting)   Pulse 88   Ht 154.9 cm (61\")   Wt 58.8 kg (129 lb 9.6 oz)   BMI 24.49 kg/m²     Constitutional:       Appearance: Frail.   Eyes:      General: Lids are normal. Allergic shiner.   HENT:      Nose: Nose normal.   Pulmonary:      Effort: Pulmonary effort is normal.      Breath sounds: Normal breath sounds.   Cardiovascular:      PMI at left midclavicular line. Normal rate. Regular rhythm. "      Murmurs: There is a systolic murmur.   Abdominal:      Palpations: Abdomen is soft.   Musculoskeletal: Normal range of motion.      Cervical back: Normal range of motion. Skin:     General: Skin is warm and dry.   Neurological:      Mental Status: Alert and oriented to person, place and time.         Procedures    [unfilled]    IHD/abnormal stress test:   Most recent stress in 2019 did show ischemia and she reportrs some rare chest pain as well as SOA/tightness. She will continue on DAPT therapy as well as imdur and ranexa, but she will be urged to repeat cardiac workup with stress and echo to assess for any worsening ischemia, LV dysfunction, valve concerns.  More recommendations to follow.     HTN:  Blood pressure stable. No changes to current recommended.  Limited sodium intake advised. Adequate fluid encouraged.      Hyperlipidemia:  On statin therapy with lipitor. No changes to current dosing recommended. Had labs with you recently, can we have for review?     DM:  Type I DM, insulin dependent. AIC followed by your office, can we have most recent for review?     BMI noted at 24.49, good cardiac ADA diet with limited carb intake advised. She states she is doing much better as she has been limiting sugar/carb as she is now only eating 20 carbs daily.      Refills per request.     6 month follow up recommended or sooner if needed.        Problems Addressed this Visit        Cardiac and Vasculature    Coronary artery disease of native artery of native heart with stable angina pectoris (HCC)    Relevant Medications    ranolazine (RANEXA) 500 MG 12 hr tablet    isosorbide mononitrate (IMDUR) 30 MG 24 hr tablet    prasugrel (EFFIENT) 10 MG tablet    Hyperlipemia    Presence of stent in LAD coronary artery    Relevant Medications    isosorbide mononitrate (IMDUR) 30 MG 24 hr tablet    prasugrel (EFFIENT) 10 MG tablet    IHD (ischemic heart disease)    Relevant Medications    ranolazine (RANEXA) 500 MG 12 hr  tablet    isosorbide mononitrate (IMDUR) 30 MG 24 hr tablet    prasugrel (EFFIENT) 10 MG tablet    Other Relevant Orders    Adult Transthoracic Echo Complete W/ Cont if Necessary Per Protocol    Stress Test With Myocardial Perfusion One Day       Endocrine and Metabolic    Type 1 diabetes mellitus with diabetic polyneuropathy (HCC)      Other Visit Diagnoses     Heart murmur    -  Primary    Relevant Orders    Adult Transthoracic Echo Complete W/ Cont if Necessary Per Protocol    Anginal equivalent (HCC)        Relevant Medications    ranolazine (RANEXA) 500 MG 12 hr tablet    isosorbide mononitrate (IMDUR) 30 MG 24 hr tablet    prasugrel (EFFIENT) 10 MG tablet    Other Relevant Orders    Adult Transthoracic Echo Complete W/ Cont if Necessary Per Protocol    Stress Test With Myocardial Perfusion One Day    Chest discomfort        Relevant Orders    Adult Transthoracic Echo Complete W/ Cont if Necessary Per Protocol    Stress Test With Myocardial Perfusion One Day    Shortness of breath        Relevant Orders    Adult Transthoracic Echo Complete W/ Cont if Necessary Per Protocol    Stress Test With Myocardial Perfusion One Day      Diagnoses       Codes Comments    Heart murmur    -  Primary ICD-10-CM: R01.1  ICD-9-CM: 785.2     IHD (ischemic heart disease)     ICD-10-CM: I25.9  ICD-9-CM: 414.9     Coronary artery disease of native artery of native heart with stable angina pectoris (HCC)     ICD-10-CM: I25.118  ICD-9-CM: 414.01, 413.9     Anginal equivalent (HCC)     ICD-10-CM: I20.8  ICD-9-CM: 413.9     Chest discomfort     ICD-10-CM: R07.89  ICD-9-CM: 786.59     Shortness of breath     ICD-10-CM: R06.02  ICD-9-CM: 786.05     Presence of stent in LAD coronary artery     ICD-10-CM: Z95.5  ICD-9-CM: V45.82     Pure hypercholesterolemia     ICD-10-CM: E78.00  ICD-9-CM: 272.0     Type 1 diabetes mellitus with diabetic polyneuropathy (HCC)     ICD-10-CM: E10.42  ICD-9-CM: 250.61, 357.2           BMI is within normal  parameters. Continued cardiac ADA diet advised.         Electronically signed by Margi Albright, SARAH May 17, 2022 14:26 EDT

## 2022-05-20 NOTE — TELEPHONE ENCOUNTER
Patient called stated her humalog has been denied. I advised patient to have the pharmacy send us the insurance denial. Please advise.

## 2022-06-23 NOTE — TELEPHONE ENCOUNTER
----- Message from SARAH Ardon sent at 6/23/2022  8:12 AM EDT -----  Please inform patient to increase Imdur to 60 mg daily. If symptoms persist then need elective cardiac cath.

## 2022-09-01 NOTE — PROGRESS NOTES
"     Office Note      Date: 2022  Patient Name: Sudnay Mcclure  MRN: [id number]  : 1955    Chief Complaint   Patient presents with   • Diabetes     3 mth follow up       History of Present Illness:   Sunday Mcclure is a 67 y.o. female who presents for Diabetes - type 1  Using insulin in a tandem pump with Bg is check 288 times per day with dexcom.  Insulin doses are adjusted frequently based upon the readings.  Patient has occasional hypoglycemia.  Patient has been using the system during the last 3 months.  She has had type 1 for over 50 years.   I gave her 50 year award today !!    Last A1c:  Hemoglobin A1C   Date Value Ref Range Status   2022 7.2 % Final       Changes in health since last visit: none . Last eye exam up ;to date  Foot check and labs are up to date .    Subjective       Review of Systems:   Review of Systems   Constitutional: Negative.    HENT: Negative.    Eyes: Negative.    Respiratory: Negative.        The following portions of the patient's history were reviewed and updated as appropriate: allergies, current medications, past family history, past medical history, past social history, past surgical history and problem list.    Objective     Visit Vitals  /62 (BP Location: Left arm, Patient Position: Sitting, Cuff Size: Adult)   Pulse 78   Resp 16   Ht 154.9 cm (61\")   Wt 61.2 kg (135 lb)   SpO2 96%   BMI 25.51 kg/m²       Labs:    CMP  Lab Results   Component Value Date    GLUCOSE 193 (H) 10/08/2021    BUN 22 2022    CREATININE 1.03 2022    EGFRIFNONA 54 (L) 2022    EGFRIFAFRI >60 2022    BCR 21 2022    K 5.1 (H) 2022    CO2 27 2022    CALCIUM 8.8 (L) 2022    AST 54 (H) 2022    ALT 38 (H) 2022        CBC w/DIFF  Lab Results   Component Value Date    WBC 6.55 2022    RBC 4.34 2022    HGB 12.2 2022    HCT 37.5 2022    MCV 86 2022    MCH 28.1 2022    MCHC 32.5 2022    " RDW 13.0 03/31/2022    MPV 10.6 03/31/2022     03/31/2022    NEUTRORELPCT 55.0 03/31/2022    LYMPHORELPCT 33.0 03/31/2022    MONORELPCT 8.0 03/31/2022    EOSRELPCT 3.0 03/31/2022    BASORELPCT 1.0 03/31/2022    AUTOIGPER 0.0 03/31/2022    NEUTROABS 3.56 03/31/2022    LYMPHSABS 2.17 03/31/2022    MONOSABS 0.53 03/31/2022    EOSABS 0.20 03/31/2022    BASOSABS 0.07 03/31/2022    AUTOIGNUM 0.02 03/31/2022    NRBC 0.0 03/31/2022       Physical Exam:  Physical Exam  Vitals reviewed.   Constitutional:       Appearance: Normal appearance.   Neurological:      Mental Status: She is alert.   Psychiatric:         Mood and Affect: Mood normal.         Behavior: Behavior normal.         Thought Content: Thought content normal.         Judgment: Judgment normal.          Assessment / Plan      Assessment & Plan:  Problem List Items Addressed This Visit        Other    Type 1 diabetes mellitus with diabetic polyneuropathy (HCC) - Primary    Current Assessment & Plan     Diabetes is improving with treatment.   Continue current treatment regimen.  Diabetes will be reassessed in 3 months.         Relevant Medications    insulin degludec (Tresiba FlexTouch) 100 UNIT/ML solution pen-injector injection    NovoLOG 100 UNIT/ML injection    Other Relevant Orders    POC Glucose Fingerstick (Completed)    POC Glycosylated Hemoglobin (Hb A1C) (Completed)           Lester Valles MD   09/01/2022

## 2022-09-19 NOTE — TELEPHONE ENCOUNTER
PATIENT STATES THAT SHE RECEIVED A NEW BATTERY FOR HER TANDEM PUMP. SHE IS HAVING DIFFICULTY GETTING HER TANDEM TO START. PLEASE CALL AS SOON AS POSSIBLE AS PATIENT HAS NOT BEEN ABLE TO USE PUMP FOR SEVERAL DAYS.      CALL BACK [phone]

## 2022-09-20 NOTE — TELEPHONE ENCOUNTER
PT CALLED STATING SHE RECEIVED A TSLIM PUMP AND WOULD LIKE ASSISTANCE IN FINDING HER AVERAGE BS ON THE DEVICE.

## 2022-09-21 NOTE — TELEPHONE ENCOUNTER
PT CALLED REQUESTING PEN NEEDLES AND ACCU CHECK TEST STRIPS TO BE SENT IN TO [facility] IN [place], [place].     PT IS OUT OF MEDS

## 2024-06-10 NOTE — ASSESSMENT & PLAN NOTE
AN A1C OF 8 WITH MINIMAL CURRENT HYPOGLYCEMIA IS ACTUALLY QUITE GOOD FOR HER.    HER DEXCOM DOWNLOAD SHOWS NO HYPOGLYCEMIA IN THE LAST 2 WEEKS WITH ONLY 11 % ABOVE 250 . 26 % IN RANGE.   ESTIMATED A1C OF 8.0    THIS IS QUITE GOOD.    SHE WILL DO WELL WITH THE TANDEM PUMP WHEN SHE CAN GET ONE TO GO WITH HER DEXCOM THIS COMING SUMMER .  
 THE HYPOGLYCEMIA IS WELL CONTROLLED WITH THE DEXCOM SINCE IT ALERTS HER TO IMPENDING LOWS. UNFORTUNATELY SHE HAS HAD SO MANY LOWS OVER THE 48 YEARS OF TYPE 1 DIABETES THAT SHE HAS A BIT OF DEMENTIA FROM IT.    
Patient was called.  She was transferred to central scheduling to schedule the 24 hour blood pressure monitor test.  
Unknown